# Patient Record
Sex: FEMALE | Race: WHITE | NOT HISPANIC OR LATINO | URBAN - METROPOLITAN AREA
[De-identification: names, ages, dates, MRNs, and addresses within clinical notes are randomized per-mention and may not be internally consistent; named-entity substitution may affect disease eponyms.]

---

## 2018-07-18 ENCOUNTER — INPATIENT (INPATIENT)
Facility: HOSPITAL | Age: 23
LOS: 1 days | Discharge: HOME | End: 2018-07-20
Attending: OBSTETRICS & GYNECOLOGY | Admitting: OBSTETRICS & GYNECOLOGY
Payer: MEDICAID

## 2018-07-18 VITALS — TEMPERATURE: 99 F

## 2018-07-18 DIAGNOSIS — Q17.8 OTHER SPECIFIED CONGENITAL MALFORMATIONS OF EAR: Chronic | ICD-10-CM

## 2018-07-18 RX ORDER — OXYTOCIN 10 UNIT/ML
41.67 VIAL (ML) INJECTION
Qty: 20 | Refills: 0 | Status: DISCONTINUED | OUTPATIENT
Start: 2018-07-18 | End: 2018-07-19

## 2018-07-18 RX ORDER — AMPICILLIN TRIHYDRATE 250 MG
2 CAPSULE ORAL ONCE
Qty: 0 | Refills: 0 | Status: COMPLETED | OUTPATIENT
Start: 2018-07-18 | End: 2018-07-18

## 2018-07-18 RX ORDER — AMPICILLIN TRIHYDRATE 250 MG
CAPSULE ORAL
Qty: 0 | Refills: 0 | Status: DISCONTINUED | OUTPATIENT
Start: 2018-07-18 | End: 2018-07-18

## 2018-07-18 RX ORDER — AMPICILLIN TRIHYDRATE 250 MG
2 CAPSULE ORAL ONCE
Qty: 0 | Refills: 0 | Status: DISCONTINUED | OUTPATIENT
Start: 2018-07-18 | End: 2018-07-18

## 2018-07-18 RX ORDER — AMPICILLIN TRIHYDRATE 250 MG
1 CAPSULE ORAL EVERY 4 HOURS
Qty: 0 | Refills: 0 | Status: DISCONTINUED | OUTPATIENT
Start: 2018-07-18 | End: 2018-07-18

## 2018-07-18 RX ORDER — AMPICILLIN TRIHYDRATE 250 MG
1 CAPSULE ORAL EVERY 4 HOURS
Qty: 0 | Refills: 0 | Status: DISCONTINUED | OUTPATIENT
Start: 2018-07-18 | End: 2018-07-20

## 2018-07-18 RX ORDER — SODIUM CHLORIDE 9 MG/ML
1000 INJECTION, SOLUTION INTRAVENOUS
Qty: 0 | Refills: 0 | Status: DISCONTINUED | OUTPATIENT
Start: 2018-07-18 | End: 2018-07-19

## 2018-07-18 RX ORDER — SODIUM CHLORIDE 9 MG/ML
125 INJECTION, SOLUTION INTRAVENOUS ONCE
Qty: 0 | Refills: 0 | Status: DISCONTINUED | OUTPATIENT
Start: 2018-07-18 | End: 2018-07-19

## 2018-07-18 RX ADMIN — Medication 216 GRAM(S): at 17:46

## 2018-07-18 NOTE — OB PROVIDER TRIAGE NOTE - NSHPPHYSICALEXAM_GEN_ALL_CORE
Vital Signs Last 24 Hrs  T(C): 37.3 (18 Jul 2018 16:48), Max: 37.3 (18 Jul 2018 16:48)  T(F): 99.1 (18 Jul 2018 16:48), Max: 99.1 (18 Jul 2018 16:48)    Udip: 40 ketones, moderate blood, trace protein, trace leukocytes  EFM: 130/mod/accel+  [place]: q2-5min, irregular  SVE: 3/90/-1 as per Dr. Ibarra's exam Vital Signs Last 24 Hrs  T(C): 37.3 (18 Jul 2018 16:48), Max: 37.3 (18 Jul 2018 16:48)  T(F): 99.1 (18 Jul 2018 16:48), Max: 99.1 (18 Jul 2018 16:48)  BP: 131/77  HR: 107    Udip: 40 ketones, moderate blood, trace protein, trace leukocytes  EFM: 130/mod/accel+  [place]: q2-5min, irregular  SVE: 3/90/-1 as per Dr. Ibarra's exam

## 2018-07-18 NOTE — OB PROVIDER H&P - NSNYCREQUIREMENTS_OBGYN_ALL_OB
ADD [facility] REQUIREMENTS SECTION ([facility]/J/SI)...

## 2018-07-18 NOTE — OB PROVIDER H&P - HISTORY OF PRESENT ILLNESS
22 yo  @39w2d w/ SANTINO of 2018 by LMP consistent w/ 1st trimester sono presents with contractions that started at  (07/15/18) night, 5-6/10 intensity, q8min irregular. Denies LOF and VB, feels good FM. No complications.

## 2018-07-18 NOTE — OB PROVIDER TRIAGE NOTE - HISTORY OF PRESENT ILLNESS
22 yo  @39w2d w/ SANTINO of 2018 by LMP consistent w/ 1st trimester sono presents with contractions that started at  (07/15/18) night, 5-6/10 intensity, q8min irregular. Denies LOF and VB, feels good FM. No complications. Last examined today at the office, her cervix was 5cm open.    OB Hx: primigravid  GYN Hx: denies fibroids, abnormal paps, STIs and ovarian cysts 22 yo  @39w2d w/ SANTINO of 2018 by LMP consistent w/ 1st trimester sono presents with contractions that started at  (07/15/18) night, 5-6/10 intensity, q8min irregular. Denies LOF and VB, feels good FM. No complications.     OB Hx: primigravid  GYN Hx: denies fibroids, abnormal paps, STIs and ovarian cysts

## 2018-07-18 NOTE — OB PROVIDER TRIAGE NOTE - NSOBPROVIDERNOTE_OBGYN_ALL_OB_FT
22 yo  @39w2d, GBS pos, in early labor for ambulation w/ IV ampicillin.    -Labor precautions given  -PO hydration encouraged  -Reevaluate in 2-3 hours    Dr. Ibarra aware. Dr. Bean to be made aware.

## 2018-07-18 NOTE — OB PROVIDER H&P - NSHPPHYSICALEXAM_GEN_ALL_CORE
Vital Signs Last 24 Hrs  T(C): 37.3 (18 Jul 2018 16:48), Max: 37.3 (18 Jul 2018 16:48)  T(F): 99.1 (18 Jul 2018 16:48), Max: 99.1 (18 Jul 2018 16:48)  HR: 107  BP: 131/77    Udip: 40 ketones, moderate blood, trace protein, trace leukocytes  EFM: 130/mod/accel+  [place]: q2-5min, irregular  SVE: 3/90/-1 as per Dr. Ibarra's exam

## 2018-07-18 NOTE — OB PROVIDER H&P - ASSESSMENT
22 yo  @39w2d, GBS pos, in early labor.     -Admit  -IVF  -Ampicillin for GBS ppx  -Pain management prn  -Continuous EFM/toco    Dr. Curry and Dr. Zepeda aware.

## 2018-07-18 NOTE — OB PROVIDER H&P - ATTENDING COMMENTS
22 yo  @39w2d, GBS pos, in early labor.   -admit   -iv access  -iv abx for gbs prophylaxis   -epidural requested   -arom w/ next exam

## 2018-07-19 ENCOUNTER — TRANSCRIPTION ENCOUNTER (OUTPATIENT)
Age: 23
End: 2018-07-19

## 2018-07-19 LAB
AMPHET UR-MCNC: NEGATIVE — SIGNIFICANT CHANGE UP
APPEARANCE UR: CLEAR — SIGNIFICANT CHANGE UP
BARBITURATES UR SCN-MCNC: NEGATIVE — SIGNIFICANT CHANGE UP
BASOPHILS # BLD AUTO: 0.04 K/UL — SIGNIFICANT CHANGE UP (ref 0–0.2)
BASOPHILS NFR BLD AUTO: 0.2 % — SIGNIFICANT CHANGE UP (ref 0–1)
BENZODIAZ UR-MCNC: NEGATIVE — SIGNIFICANT CHANGE UP
BILIRUB UR-MCNC: NEGATIVE — SIGNIFICANT CHANGE UP
BLD GP AB SCN SERPL QL: SIGNIFICANT CHANGE UP
COCAINE METAB.OTHER UR-MCNC: NEGATIVE — SIGNIFICANT CHANGE UP
COLOR SPEC: YELLOW — SIGNIFICANT CHANGE UP
DIFF PNL FLD: ABNORMAL
EOSINOPHIL # BLD AUTO: 0.01 K/UL — SIGNIFICANT CHANGE UP (ref 0–0.7)
EOSINOPHIL NFR BLD AUTO: 0.1 % — SIGNIFICANT CHANGE UP (ref 0–8)
EPI CELLS # UR: ABNORMAL /HPF
GLUCOSE UR QL: NEGATIVE — SIGNIFICANT CHANGE UP
HCT VFR BLD CALC: 35.5 % — LOW (ref 37–47)
HGB BLD-MCNC: 11.6 G/DL — LOW (ref 12–16)
IMM GRANULOCYTES NFR BLD AUTO: 1 % — HIGH (ref 0.1–0.3)
KETONES UR-MCNC: >=80
LEUKOCYTE ESTERASE UR-ACNC: ABNORMAL
LYMPHOCYTES # BLD AUTO: 1.36 K/UL — SIGNIFICANT CHANGE UP (ref 1.2–3.4)
LYMPHOCYTES # BLD AUTO: 8 % — LOW (ref 20.5–51.1)
MCHC RBC-ENTMCNC: 27.2 PG — SIGNIFICANT CHANGE UP (ref 27–31)
MCHC RBC-ENTMCNC: 32.7 G/DL — SIGNIFICANT CHANGE UP (ref 32–37)
MCV RBC AUTO: 83.1 FL — SIGNIFICANT CHANGE UP (ref 81–99)
METHADONE UR-MCNC: NEGATIVE — SIGNIFICANT CHANGE UP
MONOCYTES # BLD AUTO: 1.22 K/UL — HIGH (ref 0.1–0.6)
MONOCYTES NFR BLD AUTO: 7.2 % — SIGNIFICANT CHANGE UP (ref 1.7–9.3)
NEUTROPHILS # BLD AUTO: 14.13 K/UL — HIGH (ref 1.4–6.5)
NEUTROPHILS NFR BLD AUTO: 83.5 % — HIGH (ref 42.2–75.2)
NITRITE UR-MCNC: NEGATIVE — SIGNIFICANT CHANGE UP
NRBC # BLD: 0 /100 WBCS — SIGNIFICANT CHANGE UP (ref 0–0)
OPIATES UR-MCNC: NEGATIVE — SIGNIFICANT CHANGE UP
PCP SPEC-MCNC: SIGNIFICANT CHANGE UP
PH UR: 6 — SIGNIFICANT CHANGE UP (ref 5–8)
PLATELET # BLD AUTO: 147 K/UL — SIGNIFICANT CHANGE UP (ref 130–400)
PRENATAL SYPHILIS TEST: SIGNIFICANT CHANGE UP
PROPOXYPHENE QUALITATIVE URINE RESULT: NEGATIVE — SIGNIFICANT CHANGE UP
PROT UR-MCNC: NEGATIVE — SIGNIFICANT CHANGE UP
RBC # BLD: 4.27 M/UL — SIGNIFICANT CHANGE UP (ref 4.2–5.4)
RBC # FLD: 14 % — SIGNIFICANT CHANGE UP (ref 11.5–14.5)
RBC CASTS # UR COMP ASSIST: ABNORMAL /HPF
SP GR SPEC: <=1.005 — SIGNIFICANT CHANGE UP (ref 1.01–1.03)
TYPE + AB SCN PNL BLD: SIGNIFICANT CHANGE UP
UROBILINOGEN FLD QL: 0.2 — SIGNIFICANT CHANGE UP (ref 0.2–0.2)
WBC # BLD: 16.93 K/UL — HIGH (ref 4.8–10.8)
WBC # FLD AUTO: 16.93 K/UL — HIGH (ref 4.8–10.8)
WBC UR QL: ABNORMAL /HPF

## 2018-07-19 PROCEDURE — 59400 OBSTETRICAL CARE: CPT | Mod: U9

## 2018-07-19 RX ORDER — AER TRAVELER 0.5 G/1
1 SOLUTION RECTAL; TOPICAL
Qty: 0 | Refills: 0 | DISCHARGE
Start: 2018-07-19

## 2018-07-19 RX ORDER — LANOLIN
1 OINTMENT (GRAM) TOPICAL EVERY 6 HOURS
Qty: 0 | Refills: 0 | Status: DISCONTINUED | OUTPATIENT
Start: 2018-07-19 | End: 2018-07-20

## 2018-07-19 RX ORDER — DIPHENHYDRAMINE HCL 50 MG
25 CAPSULE ORAL EVERY 6 HOURS
Qty: 0 | Refills: 0 | Status: DISCONTINUED | OUTPATIENT
Start: 2018-07-19 | End: 2018-07-20

## 2018-07-19 RX ORDER — SODIUM CHLORIDE 9 MG/ML
3 INJECTION INTRAMUSCULAR; INTRAVENOUS; SUBCUTANEOUS EVERY 8 HOURS
Qty: 0 | Refills: 0 | Status: DISCONTINUED | OUTPATIENT
Start: 2018-07-19 | End: 2018-07-20

## 2018-07-19 RX ORDER — BENZOCAINE 10 %
1 GEL (GRAM) MUCOUS MEMBRANE EVERY 6 HOURS
Qty: 0 | Refills: 0 | Status: DISCONTINUED | OUTPATIENT
Start: 2018-07-19 | End: 2018-07-20

## 2018-07-19 RX ORDER — IBUPROFEN 200 MG
600 TABLET ORAL EVERY 6 HOURS
Qty: 0 | Refills: 0 | Status: DISCONTINUED | OUTPATIENT
Start: 2018-07-19 | End: 2018-07-20

## 2018-07-19 RX ORDER — DIBUCAINE 1 %
1 OINTMENT (GRAM) RECTAL EVERY 4 HOURS
Qty: 0 | Refills: 0 | Status: DISCONTINUED | OUTPATIENT
Start: 2018-07-19 | End: 2018-07-20

## 2018-07-19 RX ORDER — ACETAMINOPHEN 500 MG
650 TABLET ORAL EVERY 6 HOURS
Qty: 0 | Refills: 0 | Status: DISCONTINUED | OUTPATIENT
Start: 2018-07-19 | End: 2018-07-20

## 2018-07-19 RX ORDER — OXYTOCIN 10 UNIT/ML
41.67 VIAL (ML) INJECTION
Qty: 20 | Refills: 0 | Status: DISCONTINUED | OUTPATIENT
Start: 2018-07-19 | End: 2018-07-20

## 2018-07-19 RX ORDER — HYDROCORTISONE 1 %
1 OINTMENT (GRAM) TOPICAL EVERY 4 HOURS
Qty: 0 | Refills: 0 | Status: DISCONTINUED | OUTPATIENT
Start: 2018-07-19 | End: 2018-07-20

## 2018-07-19 RX ORDER — IBUPROFEN 200 MG
1 TABLET ORAL
Qty: 0 | Refills: 0 | DISCHARGE
Start: 2018-07-19

## 2018-07-19 RX ORDER — OXYCODONE AND ACETAMINOPHEN 5; 325 MG/1; MG/1
1 TABLET ORAL
Qty: 0 | Refills: 0 | Status: DISCONTINUED | OUTPATIENT
Start: 2018-07-19 | End: 2018-07-20

## 2018-07-19 RX ORDER — DOCUSATE SODIUM 100 MG
100 CAPSULE ORAL
Qty: 0 | Refills: 0 | Status: DISCONTINUED | OUTPATIENT
Start: 2018-07-19 | End: 2018-07-20

## 2018-07-19 RX ORDER — SIMETHICONE 80 MG/1
80 TABLET, CHEWABLE ORAL EVERY 6 HOURS
Qty: 0 | Refills: 0 | Status: DISCONTINUED | OUTPATIENT
Start: 2018-07-19 | End: 2018-07-20

## 2018-07-19 RX ORDER — PRAMOXINE HYDROCHLORIDE 150 MG/15G
1 AEROSOL, FOAM RECTAL EVERY 4 HOURS
Qty: 0 | Refills: 0 | Status: DISCONTINUED | OUTPATIENT
Start: 2018-07-19 | End: 2018-07-20

## 2018-07-19 RX ORDER — AER TRAVELER 0.5 G/1
1 SOLUTION RECTAL; TOPICAL EVERY 4 HOURS
Qty: 0 | Refills: 0 | Status: DISCONTINUED | OUTPATIENT
Start: 2018-07-19 | End: 2018-07-20

## 2018-07-19 RX ORDER — GLYCERIN ADULT
1 SUPPOSITORY, RECTAL RECTAL AT BEDTIME
Qty: 0 | Refills: 0 | Status: DISCONTINUED | OUTPATIENT
Start: 2018-07-19 | End: 2018-07-20

## 2018-07-19 RX ORDER — MAGNESIUM HYDROXIDE 400 MG/1
30 TABLET, CHEWABLE ORAL
Qty: 0 | Refills: 0 | Status: DISCONTINUED | OUTPATIENT
Start: 2018-07-19 | End: 2018-07-20

## 2018-07-19 RX ADMIN — SODIUM CHLORIDE 3 MILLILITER(S): 9 INJECTION INTRAMUSCULAR; INTRAVENOUS; SUBCUTANEOUS at 13:22

## 2018-07-19 RX ADMIN — Medication 1 TABLET(S): at 11:59

## 2018-07-19 RX ADMIN — Medication 108 GRAM(S): at 01:30

## 2018-07-19 RX ADMIN — Medication 108 GRAM(S): at 05:34

## 2018-07-19 NOTE — DISCHARGE NOTE OB - MATERIALS PROVIDED
Breastfeeding Mother’s Support Group Information/Guide to Postpartum Care/Vaccinations/[facility]  Screening Program/Tdap Vaccination (VIS Pub Date: 2012)/[place]  Immunization Record

## 2018-07-19 NOTE — DISCHARGE NOTE OB - CARE PROVIDER_API CALL
Julio Curry), Obstetrics and Gynecology  [address] [place] [address]  Phone: [phone]  Fax: [phone]

## 2018-07-19 NOTE — OB PROVIDER DELIVERY SUMMARY - NSPROVIDERDELIVERYNOTE_OBGYN_ALL_OB_FT
Patient was fully dilated and pushed. NSD live female , Apgars 9/9 , over a midline episiotomy without extension. Vtx delivered OA ,  Fetal head restituted to LOT. The anterior and posterior shoulders delivered, followed by the remaining body atraumatically. Delayed cord clamping was performed, and then clamped and cut. Cord blood & gases collected. The  was handed to mother for skin to skin. The placenta delivered spontaneously intact with 3v cord. Uterus massaged and firm with oxytocin given IV. Cervix, vagina and perineum inspected no extensions. Repair of episiotomy , in the usual fashion with 2-0, 3-0 chromic.   EBL -300cc, hemostasis assured.

## 2018-07-19 NOTE — DISCHARGE NOTE OB - PATIENT PORTAL LINK FT
You can access the [facility] Patient Portal, offered by [facility], by registering with the following website: [email or web address]

## 2018-07-19 NOTE — DISCHARGE NOTE OB - HOSPITAL COURSE
DATE OF DISCHARGE: 18 @ 22:25    HISTORY OF PRESENT ILLNESS/HOSPITAL COURSE: HPI:  24 yo  @39w2d w/ SANTINO of 2018 by LMP consistent w/ 1st trimester sono presents with contractions that started at  (07/15/18) night, 5-6/10 intensity, q8min irregular. Denies LOF and VB, feels good FM. No complications. (2018 19:18)    PAST MEDICAL & SURGICAL HISTORY:  No pertinent past medical history  Eustachian tube anomaly      PROCEDURES PERFORMED: vaginal delivery    COMPLICATIONS:  none    POST PARTUM COURSE: uncomplicated      FINAL DIAGNOSIS:  normal delivery    LABS:                       11.6   16.93 )-----------( 147      ( 2018 00:05 )             35.5

## 2018-07-19 NOTE — PROGRESS NOTE ADULT - SUBJECTIVE AND OBJECTIVE BOX
PGY1 Note    Patient seen at bedside. No complaints at the moment. Reports mild pain from contractions.     T(F): 98.1 (07-18 @ 19:42), Max: 99.1 (07-18 @ 16:48)  HR: 81 (07-19 @ 00:04)  BP: 107/55 (07-19 @ 00:04) (94/55 - 135/76)  RR: 18 (07-18 @ 23:10)  EFM: 120/mod dorian/accels  TOCO: 4-5min   SVE: deferred     Medications:  ampicillin  IVPB: 216 (07-18 @ 17:46)      Labs:      pending

## 2018-07-19 NOTE — PROGRESS NOTE ADULT - ASSESSMENT
HPI:  24 yo  @39w2d, GBS positive, no complications, in labor progressing well.  -Continue current management   -Pain management prn  -Continuous EFM/toco  -F/u pending labs  -Ampicillin for GBS ppx  -Reevaluate     Dr. Osullivan aware, Dr. Curry to be made aware

## 2018-07-20 VITALS
TEMPERATURE: 97 F | HEART RATE: 79 BPM | DIASTOLIC BLOOD PRESSURE: 80 MMHG | RESPIRATION RATE: 18 BRPM | SYSTOLIC BLOOD PRESSURE: 133 MMHG

## 2018-07-20 LAB
BASOPHILS # BLD AUTO: 0.03 K/UL — SIGNIFICANT CHANGE UP (ref 0–0.2)
BASOPHILS NFR BLD AUTO: 0.2 % — SIGNIFICANT CHANGE UP (ref 0–1)
EOSINOPHIL # BLD AUTO: 0.13 K/UL — SIGNIFICANT CHANGE UP (ref 0–0.7)
EOSINOPHIL NFR BLD AUTO: 0.9 % — SIGNIFICANT CHANGE UP (ref 0–8)
HCT VFR BLD CALC: 29 % — LOW (ref 37–47)
HGB BLD-MCNC: 9.5 G/DL — LOW (ref 12–16)
IMM GRANULOCYTES NFR BLD AUTO: 1.1 % — HIGH (ref 0.1–0.3)
LYMPHOCYTES # BLD AUTO: 15.6 % — LOW (ref 20.5–51.1)
LYMPHOCYTES # BLD AUTO: 2.35 K/UL — SIGNIFICANT CHANGE UP (ref 1.2–3.4)
MCHC RBC-ENTMCNC: 27.2 PG — SIGNIFICANT CHANGE UP (ref 27–31)
MCHC RBC-ENTMCNC: 32.8 G/DL — SIGNIFICANT CHANGE UP (ref 32–37)
MCV RBC AUTO: 83.1 FL — SIGNIFICANT CHANGE UP (ref 81–99)
MONOCYTES # BLD AUTO: 1.61 K/UL — HIGH (ref 0.1–0.6)
MONOCYTES NFR BLD AUTO: 10.7 % — HIGH (ref 1.7–9.3)
NEUTROPHILS # BLD AUTO: 10.77 K/UL — HIGH (ref 1.4–6.5)
NEUTROPHILS NFR BLD AUTO: 71.5 % — SIGNIFICANT CHANGE UP (ref 42.2–75.2)
NRBC # BLD: 0 /100 WBCS — SIGNIFICANT CHANGE UP (ref 0–0)
PLATELET # BLD AUTO: 141 K/UL — SIGNIFICANT CHANGE UP (ref 130–400)
RBC # BLD: 3.49 M/UL — LOW (ref 4.2–5.4)
RBC # FLD: 14 % — SIGNIFICANT CHANGE UP (ref 11.5–14.5)
WBC # BLD: 15.06 K/UL — HIGH (ref 4.8–10.8)
WBC # FLD AUTO: 15.06 K/UL — HIGH (ref 4.8–10.8)

## 2018-07-20 RX ADMIN — Medication 1 APPLICATION(S): at 08:43

## 2018-07-20 RX ADMIN — Medication 600 MILLIGRAM(S): at 08:36

## 2018-07-20 RX ADMIN — Medication 600 MILLIGRAM(S): at 00:12

## 2018-07-20 NOTE — PROGRESS NOTE ADULT - SUBJECTIVE AND OBJECTIVE BOX
OB attending  PPD #1    Pt doing well, pain well controlled. No overnight events, no acute complaints.    Ambulating: Yes  Voiding: Yes  Flatus: Yes  Bowel movements: Yes   Breast or bottle feeding: Breastfeeding  Diet: Regular    PAST MEDICAL & SURGICAL HISTORY:  No pertinent past medical history  Eustachian tube anomaly      Physical Exam  Vital Signs Last 24 Hrs  T(C): 36.7 (2018 00:31), Max: 37.2 (2018 09:08)  T(F): 98.1 (2018 00:31), Max: 99 (2018 09:08)  HR: 94 (2018 00:31) (91 - 121)  BP: 128/80 (2018 00:31) (105/57 - 135/70)  BP(mean): --  RR: 18 (2018 00:31) (18 - 19)  SpO2: --  Gen: AAOx3, NAD  Abd: Soft, nontender, nondistended, BS+  Fundus: Firm, below umbilicus  Lochia: normal  Ext: No calf tenderness, no swelling    Labs:                        11.6   16.93 )-----------( 147      ( 2018 00:05 )             35.5       A/P: 22 yo , s/p , PPD #1, doing well  - continue current management

## 2018-07-26 DIAGNOSIS — Z34.90 ENCOUNTER FOR SUPERVISION OF NORMAL PREGNANCY, UNSPECIFIED, UNSPECIFIED TRIMESTER: ICD-10-CM

## 2018-07-26 DIAGNOSIS — Z3A.39 39 WEEKS GESTATION OF PREGNANCY: ICD-10-CM

## 2019-04-14 NOTE — PROCEDURE NOTE - ATTENDING PROVIDER
Avshalumov *** YOU ARE LEAVING THE HOSPITAL AGAINST MEDICAL ADVISE: YOU HAVE VERBALIZED UNDERSTANDING OF THE POSSIBLE RISKS OF DISCHARGE BEFORE COMPLETING MEDICAL TREATMENT UP TO AND INCLUDING PERMANENT DISABILITY AND DEATH>

## 2019-05-28 ENCOUNTER — ASOB RESULT (OUTPATIENT)
Age: 24
End: 2019-05-28

## 2019-05-28 ENCOUNTER — APPOINTMENT (OUTPATIENT)
Dept: ANTEPARTUM | Facility: CLINIC | Age: 24
End: 2019-05-28
Payer: MEDICAID

## 2019-05-28 PROCEDURE — 76811 OB US DETAILED SNGL FETUS: CPT

## 2019-06-04 ENCOUNTER — APPOINTMENT (OUTPATIENT)
Dept: ANTEPARTUM | Facility: CLINIC | Age: 24
End: 2019-06-04
Payer: MEDICAID

## 2019-06-04 ENCOUNTER — ASOB RESULT (OUTPATIENT)
Age: 24
End: 2019-06-04

## 2019-06-04 PROCEDURE — 76816 OB US FOLLOW-UP PER FETUS: CPT

## 2019-09-18 ENCOUNTER — INPATIENT (INPATIENT)
Facility: HOSPITAL | Age: 24
LOS: 3 days | Discharge: HOME | End: 2019-09-22
Attending: OBSTETRICS & GYNECOLOGY | Admitting: OBSTETRICS & GYNECOLOGY
Payer: MEDICAID

## 2019-09-18 VITALS
HEIGHT: 63 IN | TEMPERATURE: 99 F | SYSTOLIC BLOOD PRESSURE: 126 MMHG | RESPIRATION RATE: 20 BRPM | WEIGHT: 169.09 LBS | HEART RATE: 109 BPM | DIASTOLIC BLOOD PRESSURE: 73 MMHG

## 2019-09-18 DIAGNOSIS — Q17.8 OTHER SPECIFIED CONGENITAL MALFORMATIONS OF EAR: Chronic | ICD-10-CM

## 2019-09-18 LAB
AMPHET UR-MCNC: NEGATIVE — SIGNIFICANT CHANGE UP
APPEARANCE UR: CLEAR — SIGNIFICANT CHANGE UP
BACTERIA # UR AUTO: NEGATIVE — SIGNIFICANT CHANGE UP
BARBITURATES UR SCN-MCNC: NEGATIVE — SIGNIFICANT CHANGE UP
BASOPHILS # BLD AUTO: 0.03 K/UL — SIGNIFICANT CHANGE UP (ref 0–0.2)
BASOPHILS NFR BLD AUTO: 0.2 % — SIGNIFICANT CHANGE UP (ref 0–1)
BENZODIAZ UR-MCNC: NEGATIVE — SIGNIFICANT CHANGE UP
BILIRUB UR-MCNC: NEGATIVE — SIGNIFICANT CHANGE UP
BLD GP AB SCN SERPL QL: SIGNIFICANT CHANGE UP
BUPRENORPHINE SCREEN, URINE RESULT: NEGATIVE — SIGNIFICANT CHANGE UP
COCAINE METAB.OTHER UR-MCNC: NEGATIVE — SIGNIFICANT CHANGE UP
COLOR SPEC: YELLOW — SIGNIFICANT CHANGE UP
DIFF PNL FLD: SIGNIFICANT CHANGE UP
EOSINOPHIL # BLD AUTO: 0.01 K/UL — SIGNIFICANT CHANGE UP (ref 0–0.7)
EOSINOPHIL NFR BLD AUTO: 0.1 % — SIGNIFICANT CHANGE UP (ref 0–8)
EPI CELLS # UR: 4 /HPF — SIGNIFICANT CHANGE UP (ref 0–5)
GLUCOSE UR QL: NEGATIVE — SIGNIFICANT CHANGE UP
HCT VFR BLD CALC: 37.6 % — SIGNIFICANT CHANGE UP (ref 37–47)
HGB BLD-MCNC: 12.2 G/DL — SIGNIFICANT CHANGE UP (ref 12–16)
HYALINE CASTS # UR AUTO: 2 /LPF — SIGNIFICANT CHANGE UP (ref 0–7)
IMM GRANULOCYTES NFR BLD AUTO: 1 % — HIGH (ref 0.1–0.3)
KETONES UR-MCNC: NEGATIVE — SIGNIFICANT CHANGE UP
L&D DRUG SCREEN, URINE: SIGNIFICANT CHANGE UP
LEUKOCYTE ESTERASE UR-ACNC: NEGATIVE — SIGNIFICANT CHANGE UP
LYMPHOCYTES # BLD AUTO: 1.36 K/UL — SIGNIFICANT CHANGE UP (ref 1.2–3.4)
LYMPHOCYTES # BLD AUTO: 8.6 % — LOW (ref 20.5–51.1)
MCHC RBC-ENTMCNC: 26.9 PG — LOW (ref 27–31)
MCHC RBC-ENTMCNC: 32.4 G/DL — SIGNIFICANT CHANGE UP (ref 32–37)
MCV RBC AUTO: 82.8 FL — SIGNIFICANT CHANGE UP (ref 81–99)
METHADONE UR-MCNC: NEGATIVE — SIGNIFICANT CHANGE UP
MONOCYTES # BLD AUTO: 0.95 K/UL — HIGH (ref 0.1–0.6)
MONOCYTES NFR BLD AUTO: 6 % — SIGNIFICANT CHANGE UP (ref 1.7–9.3)
NEUTROPHILS # BLD AUTO: 13.32 K/UL — HIGH (ref 1.4–6.5)
NEUTROPHILS NFR BLD AUTO: 84.1 % — HIGH (ref 42.2–75.2)
NITRITE UR-MCNC: NEGATIVE — SIGNIFICANT CHANGE UP
NRBC # BLD: 0 /100 WBCS — SIGNIFICANT CHANGE UP (ref 0–0)
OPIATES UR-MCNC: NEGATIVE — SIGNIFICANT CHANGE UP
OXYCODONE UR-MCNC: NEGATIVE — SIGNIFICANT CHANGE UP
PCP UR-MCNC: NEGATIVE — SIGNIFICANT CHANGE UP
PH UR: 7.5 — SIGNIFICANT CHANGE UP (ref 5–8)
PLATELET # BLD AUTO: 144 K/UL — SIGNIFICANT CHANGE UP (ref 130–400)
PRENATAL SYPHILIS TEST: SIGNIFICANT CHANGE UP
PROPOXYPHENE QUALITATIVE URINE RESULT: NEGATIVE — SIGNIFICANT CHANGE UP
PROT UR-MCNC: ABNORMAL
RBC # BLD: 4.54 M/UL — SIGNIFICANT CHANGE UP (ref 4.2–5.4)
RBC # FLD: 13.1 % — SIGNIFICANT CHANGE UP (ref 11.5–14.5)
RBC CASTS # UR COMP ASSIST: 9 /HPF — HIGH (ref 0–4)
SP GR SPEC: 1.02 — SIGNIFICANT CHANGE UP (ref 1.01–1.02)
UROBILINOGEN FLD QL: SIGNIFICANT CHANGE UP
WBC # BLD: 15.83 K/UL — HIGH (ref 4.8–10.8)
WBC # FLD AUTO: 15.83 K/UL — HIGH (ref 4.8–10.8)
WBC UR QL: 2 /HPF — SIGNIFICANT CHANGE UP (ref 0–5)

## 2019-09-18 PROCEDURE — 59400 OBSTETRICAL CARE: CPT | Mod: U7

## 2019-09-18 RX ORDER — ACETAMINOPHEN 500 MG
975 TABLET ORAL
Refills: 0 | Status: DISCONTINUED | OUTPATIENT
Start: 2019-09-18 | End: 2019-09-22

## 2019-09-18 RX ORDER — PRAMOXINE HYDROCHLORIDE 150 MG/15G
1 AEROSOL, FOAM RECTAL EVERY 4 HOURS
Refills: 0 | Status: DISCONTINUED | OUTPATIENT
Start: 2019-09-18 | End: 2019-09-22

## 2019-09-18 RX ORDER — OXYTOCIN 10 UNIT/ML
333.33 VIAL (ML) INJECTION
Qty: 20 | Refills: 0 | Status: DISCONTINUED | OUTPATIENT
Start: 2019-09-18 | End: 2019-09-22

## 2019-09-18 RX ORDER — BENZOCAINE 10 %
1 GEL (GRAM) MUCOUS MEMBRANE EVERY 6 HOURS
Refills: 0 | Status: DISCONTINUED | OUTPATIENT
Start: 2019-09-18 | End: 2019-09-22

## 2019-09-18 RX ORDER — AER TRAVELER 0.5 G/1
1 SOLUTION RECTAL; TOPICAL EVERY 4 HOURS
Refills: 0 | Status: DISCONTINUED | OUTPATIENT
Start: 2019-09-18 | End: 2019-09-22

## 2019-09-18 RX ORDER — BUPIVACAINE HCL/PF 7.5 MG/ML
250 VIAL (ML) INJECTION
Refills: 0 | Status: DISCONTINUED | OUTPATIENT
Start: 2019-09-18 | End: 2019-09-22

## 2019-09-18 RX ORDER — SODIUM CHLORIDE 9 MG/ML
1000 INJECTION, SOLUTION INTRAVENOUS
Refills: 0 | Status: DISCONTINUED | OUTPATIENT
Start: 2019-09-18 | End: 2019-09-18

## 2019-09-18 RX ORDER — IBUPROFEN 200 MG
600 TABLET ORAL EVERY 6 HOURS
Refills: 0 | Status: DISCONTINUED | OUTPATIENT
Start: 2019-09-18 | End: 2019-09-22

## 2019-09-18 RX ORDER — DIPHENHYDRAMINE HCL 50 MG
25 CAPSULE ORAL EVERY 6 HOURS
Refills: 0 | Status: DISCONTINUED | OUTPATIENT
Start: 2019-09-18 | End: 2019-09-22

## 2019-09-18 RX ORDER — DIBUCAINE 1 %
1 OINTMENT (GRAM) RECTAL EVERY 6 HOURS
Refills: 0 | Status: DISCONTINUED | OUTPATIENT
Start: 2019-09-18 | End: 2019-09-22

## 2019-09-18 RX ORDER — GLYCERIN ADULT
1 SUPPOSITORY, RECTAL RECTAL AT BEDTIME
Refills: 0 | Status: DISCONTINUED | OUTPATIENT
Start: 2019-09-18 | End: 2019-09-22

## 2019-09-18 RX ORDER — HYDROCORTISONE 1 %
1 OINTMENT (GRAM) TOPICAL EVERY 6 HOURS
Refills: 0 | Status: DISCONTINUED | OUTPATIENT
Start: 2019-09-18 | End: 2019-09-22

## 2019-09-18 RX ORDER — SIMETHICONE 80 MG/1
80 TABLET, CHEWABLE ORAL EVERY 4 HOURS
Refills: 0 | Status: DISCONTINUED | OUTPATIENT
Start: 2019-09-18 | End: 2019-09-22

## 2019-09-18 RX ORDER — LANOLIN
1 OINTMENT (GRAM) TOPICAL EVERY 6 HOURS
Refills: 0 | Status: DISCONTINUED | OUTPATIENT
Start: 2019-09-18 | End: 2019-09-22

## 2019-09-18 RX ORDER — OXYCODONE HYDROCHLORIDE 5 MG/1
5 TABLET ORAL ONCE
Refills: 0 | Status: DISCONTINUED | OUTPATIENT
Start: 2019-09-18 | End: 2019-09-22

## 2019-09-18 RX ORDER — AMPICILLIN TRIHYDRATE 250 MG
1 CAPSULE ORAL EVERY 4 HOURS
Refills: 0 | Status: DISCONTINUED | OUTPATIENT
Start: 2019-09-18 | End: 2019-09-18

## 2019-09-18 RX ORDER — MAGNESIUM HYDROXIDE 400 MG/1
30 TABLET, CHEWABLE ORAL
Refills: 0 | Status: DISCONTINUED | OUTPATIENT
Start: 2019-09-18 | End: 2019-09-22

## 2019-09-18 RX ORDER — AMPICILLIN TRIHYDRATE 250 MG
2 CAPSULE ORAL ONCE
Refills: 0 | Status: COMPLETED | OUTPATIENT
Start: 2019-09-18 | End: 2019-09-18

## 2019-09-18 RX ORDER — KETOROLAC TROMETHAMINE 30 MG/ML
30 SYRINGE (ML) INJECTION ONCE
Refills: 0 | Status: DISCONTINUED | OUTPATIENT
Start: 2019-09-18 | End: 2019-09-18

## 2019-09-18 RX ORDER — DOCUSATE SODIUM 100 MG
100 CAPSULE ORAL
Refills: 0 | Status: DISCONTINUED | OUTPATIENT
Start: 2019-09-18 | End: 2019-09-22

## 2019-09-18 RX ORDER — OXYCODONE HYDROCHLORIDE 5 MG/1
5 TABLET ORAL
Refills: 0 | Status: DISCONTINUED | OUTPATIENT
Start: 2019-09-18 | End: 2019-09-22

## 2019-09-18 RX ADMIN — Medication 30 MILLIGRAM(S): at 17:20

## 2019-09-18 RX ADMIN — Medication 600 MILLIGRAM(S): at 23:32

## 2019-09-18 RX ADMIN — Medication 1 APPLICATION(S): at 23:37

## 2019-09-18 RX ADMIN — Medication 216 GRAM(S): at 12:00

## 2019-09-18 RX ADMIN — Medication 108 GRAM(S): at 15:35

## 2019-09-18 RX ADMIN — Medication 1 SPRAY(S): at 23:37

## 2019-09-18 NOTE — OB RN PATIENT PROFILE - PRETERM DELIVERIES, OB PROFILE
At [facility], one important tool we use to improve our patient services is our Patient Survey.  Following your visit you may receive our survey in the mail.    Please take the time to complete the survey.    If your visit with us was great, we want to hear about it.    If we can improve, please let us know how.         
0

## 2019-09-18 NOTE — PROGRESS NOTE ADULT - SUBJECTIVE AND OBJECTIVE BOX
PGY2 L&D Progress Note    Patient seen at bedside, doing well, no complaints.     T(F): 99.2 ( @ 11:50), Max: 99.2 ( @ 11:50)  HR: 113 ( @ 13:24)  BP: 102/60 ( @ 13:15) (102/60 - 126/73)  RR: 20 ( @ 11:50)  EFM: 140/mod/accel+/intermittent variable decels  TOCO: q4min  SVE: deferred, last exam at 1152, /-1, intact, vertex as per Dr. Hernandez's exam     Medications:  ampicillin started at 1200  epidural started at 1224    Labs:                        12.2   15.83 )-----------( 144      ( 18 Sep 2019 11:52 )             37.6       Antibody Screen: NEG (19 @ 11:52)    A/P:   25yo  at 36w1d, GBS unknown, s/p ampicillin, s/p epidural, in active labor,     -pain management prn  -cont efm/toco  -f/u pending labs (HIV, UDS, RPR)  -cont to monitor vitals  -cont iv hydration    Dr. Hernandez and Dr. Deleon to be made aware.

## 2019-09-18 NOTE — OB PROVIDER H&P - HISTORY OF PRESENT ILLNESS
24y  @ 36.1 weeks by first trimester sono, EDC 10/15/19, present for ctx. Ctx began 2100, q 10 mins, now q 2 mins. GBS unknown. Denies VB and LOF. +FM. No complications with this pregnancy. Last seen in the office today, exam was 6 cm.

## 2019-09-18 NOTE — OB PROVIDER DELIVERY SUMMARY - NSPROVIDERDELIVERYNOTE_OBGYN_ALL_OB_FT
Patient was fully dilated and pushing. Fetal head was OA and restituted to LOT. The anterior and posterior shoulders delivered, followed by the remaining body atraumatically. Delayed cord clamping was performed, and then clamped and cut. Cord blood gases collected x2. The  was handed to the mother. The placenta delivered intact with membranes. Pitocin was administered. Uterus massaged, fundus found to be firm. Cervix, vagina and perineum inspected. Second degree laceration was noted, repaired using 2-0 chromic in the usual fashion with good hemostasis.     Viable male infant delivered, weighing 2730 gms, with APGARs 9/9    Laceration: 2nd degree laceration  EBL 300cc

## 2019-09-18 NOTE — PROCEDURE NOTE - NSLOADDOSE_OBGYN_ALL_OB
0.1% Bupivacaine with 250 mcg Fentanyl infusion started @ 15 ml/hr/10 ML of 0.25 percent Bupivicaine

## 2019-09-18 NOTE — OB PROVIDER H&P - NSHPPHYSICALEXAM_GEN_ALL_CORE
T(C): 37.3 (09-18-19 @ 11:50), Max: 37.3 (09-18-19 @ 11:50)  HR: 109 (09-18-19 @ 11:50) (109 - 109)  BP: 126/73 (09-18-19 @ 11:50) (126/73 - 126/73)  RR: 20 (09-18-19 @ 11:50) (20 - 20)    EFM: 160 bpm, moderate variability, +accels  TOCO: q 2 mins

## 2019-09-18 NOTE — OB PROVIDER H&P - ASSESSMENT
24y  @ 36.1 weeks, GBS unknown, in active labor.     Admit to L & D  IV hydration, labs  IV antibiotics  Continuous EFM & TOCO monitoring  Clear Liquid diet  Pain Management PRN    Dr. Hernandez aware

## 2019-09-18 NOTE — OB PROVIDER H&P - NS_OBGYNHISTORY_OBGYN_ALL_OB_FT
OB Hx:  x 1. Largest 7-6               G1 2018 FT  7-6 No complications Received Epidural    Gyn Hx:  Denies cysts, fibroids, abnormal paps, and STIs.

## 2019-09-18 NOTE — PROGRESS NOTE ADULT - SUBJECTIVE AND OBJECTIVE BOX
PGY4 Note    Pt reexamined and was 9/100/0 per PMD, intact,   EFM: 130, mod dorian, +accel  [place]: q4mins  Will continue to monitor for urge to push, will attempt to make the 4 hour heide to pt gets second dose of ampicillin for GBS unknown status <37wks.     Dr. Hernandez at bedside

## 2019-09-19 LAB
HCT VFR BLD CALC: 34.2 % — LOW (ref 37–47)
HGB BLD-MCNC: 11 G/DL — LOW (ref 12–16)
HIV 1+2 AB+HIV1 P24 AG SERPL QL IA: SIGNIFICANT CHANGE UP
MCHC RBC-ENTMCNC: 27 PG — SIGNIFICANT CHANGE UP (ref 27–31)
MCHC RBC-ENTMCNC: 32.2 G/DL — SIGNIFICANT CHANGE UP (ref 32–37)
MCV RBC AUTO: 83.8 FL — SIGNIFICANT CHANGE UP (ref 81–99)
NRBC # BLD: 0 /100 WBCS — SIGNIFICANT CHANGE UP (ref 0–0)
PLATELET # BLD AUTO: 153 K/UL — SIGNIFICANT CHANGE UP (ref 130–400)
RBC # BLD: 4.08 M/UL — LOW (ref 4.2–5.4)
RBC # FLD: 13.2 % — SIGNIFICANT CHANGE UP (ref 11.5–14.5)
WBC # BLD: 14.11 K/UL — HIGH (ref 4.8–10.8)
WBC # FLD AUTO: 14.11 K/UL — HIGH (ref 4.8–10.8)

## 2019-09-19 RX ADMIN — Medication 600 MILLIGRAM(S): at 18:16

## 2019-09-19 RX ADMIN — Medication 975 MILLIGRAM(S): at 21:57

## 2019-09-19 RX ADMIN — Medication 600 MILLIGRAM(S): at 11:57

## 2019-09-19 RX ADMIN — Medication 600 MILLIGRAM(S): at 06:39

## 2019-09-19 NOTE — PROGRESS NOTE ADULT - SUBJECTIVE AND OBJECTIVE BOX
Subjective:   Patient doing well. No complaints. Minimal lochia. Pain controlled.    Objective:   T(F): 96.8 ( @ 08:19), Max: 99 ( @ 18:47)  HR: 76 ( @ 08:19)  BP: 105/61 ( @ 08:19) (104/71 - 133/60)  RR: 19 ( @ 08:19)  SpO2: 100% ( @ 16:34) (100% - 100%)  Gen: AAOx3, NAD  Abd: Soft, Nontender, Nondistended, Fundus firm below the umbilicus  Ext: no tender, mild edema  Min Lochia Rubra    Labs:                        11.0   14.11 )-----------( 153      ( 19 Sep 2019 12:08 )             34.2             Tolerating regular diet  Passed flatus, passed bowel movement  Breast/Bottle feeding    Assessment:   24y s/p , PPD#1, doing well    Plan:  -Routine postpartum care  -Encouraged ambulation and PO hydration  -Tolerating regular diet

## 2019-09-20 ENCOUNTER — TRANSCRIPTION ENCOUNTER (OUTPATIENT)
Age: 24
End: 2019-09-20

## 2019-09-20 RX ADMIN — Medication 600 MILLIGRAM(S): at 18:42

## 2019-09-20 RX ADMIN — Medication 600 MILLIGRAM(S): at 12:51

## 2019-09-20 RX ADMIN — Medication 600 MILLIGRAM(S): at 00:08

## 2019-09-20 RX ADMIN — Medication 600 MILLIGRAM(S): at 23:32

## 2019-09-20 NOTE — DISCHARGE NOTE OB - MEDICATION SUMMARY - MEDICATIONS TO STOP TAKING
I will STOP taking the medications listed below when I get home from the hospital:    ibuprofen 600 mg oral tablet  -- 1 tab(s) by mouth every 6 hours, As needed, Moderate Pain    witch hazel 50% topical pad  -- 1 application on skin every 4 hours, As needed, Perineal Discomfort    Prenatal Multivitamins with Folic Acid 1 mg oral tablet  -- 1 tab(s) by mouth once a day

## 2019-09-20 NOTE — DISCHARGE NOTE OB - PATIENT PORTAL LINK FT
You can access the FollowMyHealth Patient Portal offered by [facility] by registering at the following website: [email or web address]. By joining [email or web address]’s FollowMyHealth portal, you will also be able to view your health information using other applications (apps) compatible with our system.

## 2019-09-20 NOTE — DISCHARGE NOTE OB - CARE PROVIDER_API CALL
Julio Curry)  Obstetrics and Gynecology  [address] [place] [address]  Phone: [phone]  Fax: [phone]  Follow Up Time:

## 2019-09-20 NOTE — DISCHARGE NOTE OB - ADDITIONAL INSTRUCTIONS
If you experience any of the following, please notify your provider:  -fever >100.4F  -increased vaginal bleeding or clotting (saturating a pad an hour)  -foul smelling discharge or bloody discharge from your incision site  -severe abdominal, vaginal, or rectal pain   -persistent headache or vision changes  -swollen areas on your legs that are red, hot, or painful   -swollen, hot, painful areas and/or streaks on your breasts  -cracked or bleeding nipples  -mood swings, depression, or crying spells lasting more than 3 days     Please schedule an appointment to see your physician in 6 weeks for a postpartum visit If you experience any of the following, please notify your provider:  -fever >100.4F  -increased vaginal bleeding or clotting (saturating a pad an hour)  -foul smelling discharge or bloody discharge from your incision site  -severe abdominal, vaginal, or rectal pain   -persistent headache or vision changes  -swollen areas on your legs that are red, hot, or painful   -swollen, hot, painful areas and/or streaks on your breasts  -cracked or bleeding nipples  -mood swings, depression, or crying spells lasting more than 3 days     Please schedule an appointment to see your physician on Wednesday for a blood pressure check

## 2019-09-20 NOTE — PROGRESS NOTE ADULT - SUBJECTIVE AND OBJECTIVE BOX
OB attending  PPD #2    Pt doing well, pain well controlled. No overnight events, no acute complaints.    Ambulating: Yes  Voiding: Yes  Flatus: Yes  Bowel movements: Yes   Breast or bottle feeding: Breastfeeding  Diet: Regular    PAST MEDICAL & SURGICAL HISTORY:  No pertinent past medical history  Eustachian tube anomaly      Physical Exam  Vital Signs Last 24 Hrs  T(C): 35.8 (20 Sep 2019 00:10), Max: 36 (19 Sep 2019 08:19)  T(F): 96.5 (20 Sep 2019 00:10), Max: 96.8 (19 Sep 2019 08:19)  HR: 79 (20 Sep 2019 00:10) (72 - 79)  BP: 127/74 (20 Sep 2019 00:10) (97/56 - 127/74)  BP(mean): --  RR: 18 (20 Sep 2019 00:10) (18 - 19)  SpO2: --  Gen: AAOx3, NAD  Abd: Soft, nontender, nondistended, BS+  Fundus: Firm, below umbilicus  Lochia: normal  Ext: No calf tenderness, no swelling    Labs:                        11.0   14.11 )-----------( 153      ( 19 Sep 2019 12:08 )             34.2         A/P: s/p , PPD #2, doing well  - d/c home OB attending  PPD #2    Pt doing well, pain well controlled. No overnight events, no acute complaints.    Ambulating: Yes  Voiding: Yes  Flatus: Yes  Bowel movements: Yes   Breast or bottle feeding: Breastfeeding  Diet: Regular    PAST MEDICAL & SURGICAL HISTORY:  No pertinent past medical history  Eustachian tube anomaly      Physical Exam  Vital Signs Last 24 Hrs  T(C): 35.8 (20 Sep 2019 00:10), Max: 36 (19 Sep 2019 08:19)  T(F): 96.5 (20 Sep 2019 00:10), Max: 96.8 (19 Sep 2019 08:19)  HR: 79 (20 Sep 2019 00:10) (72 - 79)  BP: 127/74 (20 Sep 2019 00:10) (97/56 - 127/74)  BP(mean): --  RR: 18 (20 Sep 2019 00:10) (18 - 19)  SpO2: --  Gen: AAOx3, NAD  Abd: Soft, nontender, nondistended, BS+  Fundus: Firm, below umbilicus  Lochia: normal  Ext: No calf tenderness, no swelling    Labs:                        11.0   14.11 )-----------( 153      ( 19 Sep 2019 12:08 )             34.2         A/P: s/p , PPD #2, doing well  - to stay one more day

## 2019-09-20 NOTE — DISCHARGE NOTE OB - HOSPITAL COURSE
DATE OF DISCHARGE: 19 @ 07:04    HISTORY OF PRESENT ILLNESS/HOSPITAL COURSE: HPI:  24y  @ 36.1 weeks by first trimester pema, EDC 10/15/19, present for ctx. Ctx began 2100, q 10 mins, now q 2 mins. GBS unknown. Denies VB and LOF. +FM. No complications with this pregnancy. Last seen in the office today, exam was 6 cm. (18 Sep 2019 11:49)    PAST MEDICAL & SURGICAL HISTORY:  No pertinent past medical history  Eustachian tube anomaly      PROCEDURES PERFORMED: vaginal delivery    COMPLICATIONS:  none    POST PARTUM COURSE: ucomplicated       FINAL DIAGNOSIS:  normal vaginal delivery    LABS:                       11.0   14.11 )-----------( 153      ( 19 Sep 2019 12:08 )             34.2

## 2019-09-20 NOTE — DISCHARGE NOTE OB - REASON FOR REFUSAL (REFER PATIENT TO HEALTHCARE PROVIDER FOR FOLLOW-UP):
Detail Level: Zone Note Text (......Xxx Chief Complaint.): This diagnosis correlates with the Other (Free Text): - AA vs. PPD improved today; pt has used sample of impoyx 1 week \\n- pt was taken off of cardizem a put remains on eliquis. Pt reports getting a new lesion after stopping the cardizem \\n- pt reports palms had recently became more red than normal \\n- discussed biopsy today; pt opted for punch biopsy today \\n- pt is on eliquis due to cardiovert pt 9-23-19; pt is not sure if she will have to be on eliquis for long term \\n- due to pt only taking one BP med, pt requested we take BP, BP was WNL\\n- more samples of impoyx given to pt in case she gets new lesions requested pt to take pictures if she gets more lesions\\n- BP high in clinic today, discussed to call doctor that is over seeing BP and to reports blood pressure. \\n- Cont Dry and sensitive skin care; spf 30+/sun protex \\nRTC 10 days s/f I don't want it right now

## 2019-09-21 RX ADMIN — Medication 600 MILLIGRAM(S): at 23:54

## 2019-09-21 RX ADMIN — Medication 600 MILLIGRAM(S): at 12:32

## 2019-09-21 NOTE — PROGRESS NOTE ADULT - SUBJECTIVE AND OBJECTIVE BOX
Subjective:   Patient doing well. No complaints. Minimal lochia. Pain controlled.    Objective:   T(F): 97 ( @ 07:48), Max: 97 ( @ 07:48)  HR: 100 ( @ 07:48)  BP: 130/77 (- @ 07:48) (127/74 - 134/72)  RR: 18 (- @ 07:48)  SpO2: --  Gen: AAOx3, NAD  Abd: Soft, Nontender, Nondistended, Fundus firm below the umbilicus  Ext: no tender, mild edema  Min Lochia Rubra    Labs:                        11.0   14.11 )-----------( 153      ( 19 Sep 2019 12:08 )             34.2             Tolerating regular diet  Passed flatus, passed bowel movement  Breast/Bottle feeding    Assessment:   24y s/p , PPD, doing well    Plan:  -Routine postpartum care  -Encouraged ambulation and PO hydration  -Tolerating regular diet

## 2019-09-22 VITALS — SYSTOLIC BLOOD PRESSURE: 136 MMHG | DIASTOLIC BLOOD PRESSURE: 70 MMHG

## 2019-09-22 RX ADMIN — Medication 600 MILLIGRAM(S): at 06:36

## 2019-09-22 NOTE — PROGRESS NOTE ADULT - SUBJECTIVE AND OBJECTIVE BOX
Subjective:   Patient doing well. No complaints. Minimal lochia. Pain controlled.  No PIH sx.  Patient anxious to leave and baby has been in High Risk nursery.    Objective:   T(F): 96.4 ( @ 09:20), Max: 98.7 ( @ 12:01)  HR: 63 ( @ 09:20)  BP: 141/66 ( @ 09:20) (125/67 - 143/80)  RR: 18 ( @ 09:20)  SpO2: --  Gen: AAOx3, NAD  Abd: Soft, Nontender, Nondistended, Fundus firm below the umbilicus  Ext: no tender, mild edema  Min Lochia Rubra    Labs:            Tolerating regular diet  Passed flatus, passed bowel movement  Breast/Bottle feeding    Assessment:   24y s/p , PPD doing well    Plan:  -Routine postpartum care  -Encouraged ambulation and PO hydration  -Tolerating regular diet  -Will repeat BP and if stable, will d/c home today with precautions and f/u 48 hrs for bp.

## 2019-09-30 DIAGNOSIS — Z3A.36 36 WEEKS GESTATION OF PREGNANCY: ICD-10-CM

## 2019-09-30 DIAGNOSIS — Z34.93 ENCOUNTER FOR SUPERVISION OF NORMAL PREGNANCY, UNSPECIFIED, THIRD TRIMESTER: ICD-10-CM

## 2020-06-08 NOTE — OB PROVIDER TRIAGE NOTE - NS_CTXBYPALP_OBGYN_ALL_OB
HPI: 85yo male PMH DM, Coronary Artery Disease, chronic kidney disease, PAD presenting with b/l LE pain x 3 weeks, R>L. Patient went to see vascular surgeon, Dr. Sorenson, who did US which showed low flow. Sent to ED for angiogram of RLE and to have loop recorder placed. of note, patient also has chronic anemia requiring frequent blood transfusions. States he is having some exertional dyspnea and orthopnea.     PE:  Gen: NAD  Head: NCAT  HEENT: Oral mucosa moist, normal conjunctiva  CV: RRR no murmurs, normal perfusion, diminished pulse RLE, left DP intact, cap refill <2 seconds, b/l LE warm and well perfused however RLE with purple discoloration  Resp: CTA b/l, no wheezing, rales, rhonchi, no respiratory distress  GI: Abd Soft NTND  Neuro: No focal neuro deficits  MSK: FROM all 4 extremities, no deformity  Skin: No rash, no bruising  Psych: Normal affect    MDM: 85yo male with PAD p/w b/l LE pain, R>L, also with low flow on outpatient sono, to have admission for angiogram of RLE. Also c/o exertional dyspnea, has h/o anemia requiring frequent blood transfusions. Check labs, ekg, cxr, reassess. Denisse Soliz DO         I performed a history and physical exam of the patient and discussed their management with the resident. I reviewed the resident's note and agree with the documented findings and plan of care. My medical decision making and observations are found above. None felt

## 2021-07-01 NOTE — PROCEDURE NOTE - NSPERFORMEDBY_GEN_A_CORE
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   -No source of infection  -spoke with Dr. Mack Ford 6/30, who rec switching Keppra to depakote 250 bid for seizure ppx, as keppra may contribute to pt's agitation. No further w/u planned from neuro perspective at this time, f/u as outpt.   -C/w melatonin qhs, memantine 5mg qhs (started last week by neuro)  -Currently, pt alert and oriented x2-3, calm, resting in stretcher  -Psych consult appreciate- agree with depakote, will splint to 125 qam and 375 qpm. c/w zyprexa prn agitation Anderson/Resident

## 2021-08-10 ENCOUNTER — ASOB RESULT (OUTPATIENT)
Age: 26
End: 2021-08-10

## 2021-08-10 ENCOUNTER — APPOINTMENT (OUTPATIENT)
Dept: ANTEPARTUM | Facility: CLINIC | Age: 26
End: 2021-08-10
Payer: MEDICAID

## 2021-08-10 PROCEDURE — 76817 TRANSVAGINAL US OBSTETRIC: CPT

## 2021-08-10 PROCEDURE — 76811 OB US DETAILED SNGL FETUS: CPT

## 2021-08-12 NOTE — OB RN PATIENT PROFILE - TEMPERATURE IN FAHRENHEIT (DEGREES F)
-1-2+ LE edema  --pt does not wear compression  -continue lasix, cozaar, metoprolol, elevate, limit sodium  -fu with PCP   99.2

## 2021-12-14 ENCOUNTER — INPATIENT (INPATIENT)
Facility: HOSPITAL | Age: 26
LOS: 0 days | Discharge: HOME | End: 2021-12-15
Attending: STUDENT IN AN ORGANIZED HEALTH CARE EDUCATION/TRAINING PROGRAM | Admitting: STUDENT IN AN ORGANIZED HEALTH CARE EDUCATION/TRAINING PROGRAM
Payer: MEDICAID

## 2021-12-14 VITALS
DIASTOLIC BLOOD PRESSURE: 68 MMHG | RESPIRATION RATE: 19 BRPM | TEMPERATURE: 99 F | WEIGHT: 175.05 LBS | HEIGHT: 63 IN | SYSTOLIC BLOOD PRESSURE: 120 MMHG | HEART RATE: 82 BPM

## 2021-12-14 DIAGNOSIS — Q17.8 OTHER SPECIFIED CONGENITAL MALFORMATIONS OF EAR: Chronic | ICD-10-CM

## 2021-12-14 PROCEDURE — 59400 OBSTETRICAL CARE: CPT | Mod: U9

## 2021-12-14 RX ORDER — HYDROCORTISONE 1 %
1 OINTMENT (GRAM) TOPICAL EVERY 6 HOURS
Refills: 0 | Status: DISCONTINUED | OUTPATIENT
Start: 2021-12-14 | End: 2021-12-15

## 2021-12-14 RX ORDER — OXYCODONE HYDROCHLORIDE 5 MG/1
5 TABLET ORAL ONCE
Refills: 0 | Status: DISCONTINUED | OUTPATIENT
Start: 2021-12-14 | End: 2021-12-15

## 2021-12-14 RX ORDER — OXYTOCIN 10 UNIT/ML
333.33 VIAL (ML) INJECTION
Qty: 20 | Refills: 0 | Status: DISCONTINUED | OUTPATIENT
Start: 2021-12-14 | End: 2021-12-14

## 2021-12-14 RX ORDER — SODIUM CHLORIDE 9 MG/ML
1000 INJECTION, SOLUTION INTRAVENOUS
Refills: 0 | Status: DISCONTINUED | OUTPATIENT
Start: 2021-12-14 | End: 2021-12-14

## 2021-12-14 RX ORDER — MAGNESIUM HYDROXIDE 400 MG/1
30 TABLET, CHEWABLE ORAL
Refills: 0 | Status: DISCONTINUED | OUTPATIENT
Start: 2021-12-14 | End: 2021-12-15

## 2021-12-14 RX ORDER — BENZOCAINE 10 %
1 GEL (GRAM) MUCOUS MEMBRANE EVERY 6 HOURS
Refills: 0 | Status: DISCONTINUED | OUTPATIENT
Start: 2021-12-14 | End: 2021-12-15

## 2021-12-14 RX ORDER — NALOXONE HYDROCHLORIDE 4 MG/.1ML
0.1 SPRAY NASAL
Refills: 0 | Status: DISCONTINUED | OUTPATIENT
Start: 2021-12-14 | End: 2021-12-15

## 2021-12-14 RX ORDER — TETANUS TOXOID, REDUCED DIPHTHERIA TOXOID AND ACELLULAR PERTUSSIS VACCINE, ADSORBED 5; 2.5; 8; 8; 2.5 [IU]/.5ML; [IU]/.5ML; UG/.5ML; UG/.5ML; UG/.5ML
0.5 SUSPENSION INTRAMUSCULAR ONCE
Refills: 0 | Status: DISCONTINUED | OUTPATIENT
Start: 2021-12-14 | End: 2021-12-15

## 2021-12-14 RX ORDER — SIMETHICONE 80 MG/1
80 TABLET, CHEWABLE ORAL EVERY 4 HOURS
Refills: 0 | Status: DISCONTINUED | OUTPATIENT
Start: 2021-12-14 | End: 2021-12-15

## 2021-12-14 RX ORDER — CITRIC ACID/SODIUM CITRATE 300-500 MG
15 SOLUTION, ORAL ORAL EVERY 6 HOURS
Refills: 0 | Status: DISCONTINUED | OUTPATIENT
Start: 2021-12-14 | End: 2021-12-14

## 2021-12-14 RX ORDER — OXYTOCIN 10 UNIT/ML
333.33 VIAL (ML) INJECTION
Qty: 20 | Refills: 0 | Status: DISCONTINUED | OUTPATIENT
Start: 2021-12-14 | End: 2021-12-15

## 2021-12-14 RX ORDER — OXYCODONE HYDROCHLORIDE 5 MG/1
5 TABLET ORAL
Refills: 0 | Status: DISCONTINUED | OUTPATIENT
Start: 2021-12-14 | End: 2021-12-15

## 2021-12-14 RX ORDER — SODIUM CHLORIDE 9 MG/ML
3 INJECTION INTRAMUSCULAR; INTRAVENOUS; SUBCUTANEOUS EVERY 8 HOURS
Refills: 0 | Status: DISCONTINUED | OUTPATIENT
Start: 2021-12-14 | End: 2021-12-15

## 2021-12-14 RX ORDER — KETOROLAC TROMETHAMINE 30 MG/ML
30 SYRINGE (ML) INJECTION ONCE
Refills: 0 | Status: DISCONTINUED | OUTPATIENT
Start: 2021-12-14 | End: 2021-12-14

## 2021-12-14 RX ORDER — INFLUENZA VIRUS VACCINE 15; 15; 15; 15 UG/.5ML; UG/.5ML; UG/.5ML; UG/.5ML
0.5 SUSPENSION INTRAMUSCULAR ONCE
Refills: 0 | Status: DISCONTINUED | OUTPATIENT
Start: 2021-12-14 | End: 2021-12-15

## 2021-12-14 RX ORDER — IBUPROFEN 200 MG
600 TABLET ORAL EVERY 6 HOURS
Refills: 0 | Status: COMPLETED | OUTPATIENT
Start: 2021-12-14 | End: 2022-11-12

## 2021-12-14 RX ORDER — ACETAMINOPHEN 500 MG
975 TABLET ORAL
Refills: 0 | Status: DISCONTINUED | OUTPATIENT
Start: 2021-12-14 | End: 2021-12-15

## 2021-12-14 RX ORDER — DIBUCAINE 1 %
1 OINTMENT (GRAM) RECTAL EVERY 6 HOURS
Refills: 0 | Status: DISCONTINUED | OUTPATIENT
Start: 2021-12-14 | End: 2021-12-15

## 2021-12-14 RX ORDER — IBUPROFEN 200 MG
600 TABLET ORAL EVERY 6 HOURS
Refills: 0 | Status: DISCONTINUED | OUTPATIENT
Start: 2021-12-14 | End: 2021-12-15

## 2021-12-14 RX ORDER — FENTANYL/BUPIVACAINE/NS/PF 2MCG/ML-.1
250 PLASTIC BAG, INJECTION (ML) INJECTION
Refills: 0 | Status: DISCONTINUED | OUTPATIENT
Start: 2021-12-14 | End: 2021-12-15

## 2021-12-14 RX ORDER — AER TRAVELER 0.5 G/1
1 SOLUTION RECTAL; TOPICAL EVERY 4 HOURS
Refills: 0 | Status: DISCONTINUED | OUTPATIENT
Start: 2021-12-14 | End: 2021-12-15

## 2021-12-14 RX ORDER — LANOLIN
1 OINTMENT (GRAM) TOPICAL EVERY 6 HOURS
Refills: 0 | Status: DISCONTINUED | OUTPATIENT
Start: 2021-12-14 | End: 2021-12-15

## 2021-12-14 RX ORDER — ONDANSETRON 8 MG/1
4 TABLET, FILM COATED ORAL EVERY 6 HOURS
Refills: 0 | Status: DISCONTINUED | OUTPATIENT
Start: 2021-12-14 | End: 2021-12-15

## 2021-12-14 RX ORDER — DEXAMETHASONE 0.5 MG/5ML
4 ELIXIR ORAL EVERY 6 HOURS
Refills: 0 | Status: DISCONTINUED | OUTPATIENT
Start: 2021-12-14 | End: 2021-12-15

## 2021-12-14 RX ORDER — DIPHENHYDRAMINE HCL 50 MG
25 CAPSULE ORAL EVERY 6 HOURS
Refills: 0 | Status: DISCONTINUED | OUTPATIENT
Start: 2021-12-14 | End: 2021-12-15

## 2021-12-14 RX ADMIN — Medication 30 MILLIGRAM(S): at 15:45

## 2021-12-14 RX ADMIN — Medication 975 MILLIGRAM(S): at 18:01

## 2021-12-14 RX ADMIN — Medication 600 MILLIGRAM(S): at 21:37

## 2021-12-14 NOTE — OB PROVIDER H&P - HISTORY OF PRESENT ILLNESS
Pt is a 26y.o.  @ 40.1wks by 2nd trimester sono presents c/o ctx. Pt denies LOF, VB and reports good FM

## 2021-12-14 NOTE — OB PROVIDER H&P - NSDOBORLOSS1_OBGYN_ALL_OB_DT
Chart reviewed.  Patient states that she is doing better, had some dizziness after OT evaluation  Discussed that she will be followed by PT today    Recommend acute inpatient rehab:PT/OT when medically stable. However if she shows good progress with bedside PT, discharge home with home care may be considered. Continue daily bedside therapy.   d/w CCC. Discussed with RN.    Hospitalist service to reassign care in am 19-Jul-2018

## 2021-12-14 NOTE — OB PROVIDER DELIVERY SUMMARY - NSPROVIDERDELIVERYNOTE_OBGYN_ALL_OB_FT
Patient was fully dilated and pushing. Head delivered OA, restituted to JOVANNY, Anterior shoulder delivered, followed by the posterior shoulder, rest of the body without complications. Baby suctioned, cord clamped and cut, and infant placed on mothers abdomen. Cord segment and blood obtained. Placenta delivered, intact. Fundus massaged and firm, with good hemostasis. Pitocin given postpartum. Vaginal vault, perineum, and cervix inspected, and first degree perineal laceration noted and repaired with 3-0 chromic with a single figure of eight stitch. Live female infant delivered.     Dr. Rosario present at bedside

## 2021-12-14 NOTE — OB PROVIDER DELIVERY SUMMARY - NSSELHIDDEN_OBGYN_ALL_OB_FT
[[email or web address]_ALL_OB_FT:[id number]],[NS_DeliveryRN_OBGYN_ALL_OB_FT:[id number]],[NS_DeliveryAssist1_OBGYN_ALL_OB_FT:[id number]]

## 2021-12-14 NOTE — OB RN PATIENT PROFILE - FALL HARM RISK - UNIVERSAL INTERVENTIONS
Bed in lowest position, wheels locked, appropriate side rails in place/Call bell, personal items and telephone in reach/Instruct patient to call for assistance before getting out of bed or chair/Non-slip footwear when patient is out of bed/[place] to call system/Physically safe environment - no spills, clutter or unnecessary equipment/Purposeful Proactive Rounding/Room/bathroom lighting operational, light cord in reach

## 2021-12-14 NOTE — OB RN DELIVERY SUMMARY - NSSELHIDDEN_OBGYN_ALL_OB_FT
[NS_DeliveryAttending1_OBGYN_ALL_OB_FT:[id number]=],[NS_DeliveryRN_OBGYN_ALL_OB_FT:[id number]=] [NS_DeliveryAttending1_OBGYN_ALL_OB_FT:[id number]=],[NS_DeliveryRN_OBGYN_ALL_OB_FT:[id number]=],[NS_DeliveryAssist1_OBGYN_ALL_OB_FT:[id number]=]

## 2021-12-14 NOTE — OB PROVIDER H&P - NSHPPHYSICALEXAM_GEN_ALL_CORE
HR: 82 (12-14-21 @ 11:13) (82 - 82)  BP: 120/68 (12-14-21 @ 11:13) (120/68 - 120/68)    VE: 7/100/-2 by Dr. Rosario  Ctx: q 4-5 mn  FHR: 135 moderate variability, Cat I

## 2021-12-14 NOTE — OB RN DELIVERY SUMMARY - NS_DELIVERYASSIST1_OBGYN_ALL_OB_FT
Palmira Jarrell MD Bilobed Flap Text: The defect edges were debeveled with a #15 scalpel blade.  Given the location of the defect and the proximity to free margins a bilobe flap was deemed most appropriate.  Using a sterile surgical marker, an appropriate bilobe flap drawn around the defect.    The area thus outlined was incised deep to adipose tissue with a #15 scalpel blade.  The skin margins were undermined to an appropriate distance in all directions utilizing iris scissors.

## 2021-12-14 NOTE — PROCEDURE NOTE - ADDITIONAL PROCEDURE DETAILS
12/14/2021 1245   Called by OB PA for epidural top off. Patient complains of 4/10 abdominal pain with contraction.  Motor/ sensory levels assessed, LT > RT T10.  Epidural catheter negative for heme/ CSF aspiration, 5mL of 0.25% Bupivacaine given.  Pt tolerated well, VSS, FHR 140S, ob RN at bedside. 2021 1245   Called by OB PA for epidural top off. Patient complains of 4/10 abdominal pain with contraction.  Motor/ sensory levels assessed, LT > RT T10.  Epidural catheter negative for heme/ CSF aspiration, 5mL of 0.25% Bupivacaine given.  Pt tolerated well, VSS, FHR 140S, ob RN at bedside.  Patient s/p . V/S stable. No anesthesia complications noted. Patient discharged to postpartum care as per protocol.

## 2021-12-14 NOTE — OB RN PATIENT PROFILE - NS_CIRCUMCISIONPLAN_OBGYN_ALL_OB
Initial SW/CM Assessment/Plan of Care Note     Baseline Assessment  77 year old year old admitted 5/7/2017 as Inpatient with a diagnosis of sepsis likely secondary to healthcare associated PNA, COPD exacerbation. Hx CAD and diabetes. Prior to admission patient was living  Alone and  residing at Apartment.  Patient does  have a Power of  for Healthcare.  Document is not activated.  Copy requested. Patient’s Primary Care Provider is Pcp Not In System.     Medical History  Past Medical History:   Diagnosis Date   • Anemia    • Anxiety     at times   • Arthritis    • COPD (chronic obstructive pulmonary disease)    • Coronary artery disease     (pt reports multiple angioplasties   • Diabetes mellitus    • Essential (primary) hypertension    • Fracture     fingers   • Gastroesophageal reflux disease    • High cholesterol    • Malignant neoplasm     skin cancer- two on nose, one on head, one on back   • Myocardial infarction     Nov 1988, (pt reports multiple MI's)   • Otitis media    • Pneumonia    • RAD (reactive airway disease)    • Rheumatoid arthritis    • Sinusitis, chronic    • Urinary incontinence      Agency/Support  Type of Services Prior to Hospitalization: None  Support Systems: Family members  Home Devices/Equipment: Home Oxygen (T), Nebulizer (T)  Mobility Assist Devices: None  Sensory Support Devices: Eyeglasses, Dentures    Current Status  Current Mental Status: Cooperative, Pleasant    Insurance  Primary: MEDICARE  Secondary: [employer]    Barriers to Discharge  Identified Barriers to Discharge/Transition Planning: Unresolved medical issues    Progress Note  Consult received for d/c planning. Chart review completed. Met with pt - introduced self and SW role. Pt agreeable to SW visit. Pt is A/Ox4. Pt stated he has an Advance Directive. Copy requested for Niesha's records.    Pt lives alone in an apartment. DME includes home oxygen (2L baseline, provided through [employer]) and a nebulizer. No current  home therapy. Pt is independent in his ADLs.    No further social service related needs identified. Please re-consult SW if pt's status changes and/or further needs arise.    Plan  SW/CM - Recommendations for Discharge: Home  Anticipate patient will not need post-hospital services.   Anticipate patient can return to the environment from which patient entered the hospital.   Anticipate patient can provide self-care at discharge.    Refer to SW/CM Flowsheet for Goals and objective data.      N/A

## 2021-12-14 NOTE — OB PROVIDER H&P - ALERT: PERTINENT HISTORY
20 Week Level II Sonogram

## 2021-12-15 ENCOUNTER — TRANSCRIPTION ENCOUNTER (OUTPATIENT)
Age: 26
End: 2021-12-15

## 2021-12-15 VITALS — DIASTOLIC BLOOD PRESSURE: 64 MMHG | HEART RATE: 68 BPM | SYSTOLIC BLOOD PRESSURE: 124 MMHG

## 2021-12-15 LAB
BASOPHILS # BLD AUTO: 0.02 K/UL — SIGNIFICANT CHANGE UP (ref 0–0.2)
BASOPHILS NFR BLD AUTO: 0.2 % — SIGNIFICANT CHANGE UP (ref 0–1)
EOSINOPHIL # BLD AUTO: 0.21 K/UL — SIGNIFICANT CHANGE UP (ref 0–0.7)
EOSINOPHIL NFR BLD AUTO: 2.1 % — SIGNIFICANT CHANGE UP (ref 0–8)
HCT VFR BLD CALC: 29.9 % — LOW (ref 37–47)
HGB BLD-MCNC: 10.2 G/DL — LOW (ref 12–16)
IMM GRANULOCYTES NFR BLD AUTO: 0.4 % — HIGH (ref 0.1–0.3)
LYMPHOCYTES # BLD AUTO: 0.67 K/UL — LOW (ref 1.2–3.4)
LYMPHOCYTES # BLD AUTO: 6.6 % — LOW (ref 20.5–51.1)
MCHC RBC-ENTMCNC: 30.6 PG — SIGNIFICANT CHANGE UP (ref 27–31)
MCHC RBC-ENTMCNC: 34.1 G/DL — SIGNIFICANT CHANGE UP (ref 32–37)
MCV RBC AUTO: 89.8 FL — SIGNIFICANT CHANGE UP (ref 81–99)
MONOCYTES # BLD AUTO: 0.64 K/UL — HIGH (ref 0.1–0.6)
MONOCYTES NFR BLD AUTO: 6.3 % — SIGNIFICANT CHANGE UP (ref 1.7–9.3)
NEUTROPHILS # BLD AUTO: 8.5 K/UL — HIGH (ref 1.4–6.5)
NEUTROPHILS NFR BLD AUTO: 84.4 % — HIGH (ref 42.2–75.2)
NRBC # BLD: 0 /100 WBCS — SIGNIFICANT CHANGE UP (ref 0–0)
PLATELET # BLD AUTO: 246 K/UL — SIGNIFICANT CHANGE UP (ref 130–400)
RBC # BLD: 3.33 M/UL — LOW (ref 4.2–5.4)
RBC # FLD: 14.2 % — SIGNIFICANT CHANGE UP (ref 11.5–14.5)
WBC # BLD: 10.08 K/UL — SIGNIFICANT CHANGE UP (ref 4.8–10.8)
WBC # FLD AUTO: 10.08 K/UL — SIGNIFICANT CHANGE UP (ref 4.8–10.8)

## 2021-12-15 RX ORDER — IBUPROFEN 200 MG
1 TABLET ORAL
Qty: 0 | Refills: 0 | DISCHARGE
Start: 2021-12-15

## 2021-12-15 RX ORDER — ACETAMINOPHEN 500 MG
3 TABLET ORAL
Qty: 0 | Refills: 0 | DISCHARGE
Start: 2021-12-15

## 2021-12-15 RX ADMIN — Medication 600 MILLIGRAM(S): at 09:38

## 2021-12-15 RX ADMIN — Medication 975 MILLIGRAM(S): at 06:26

## 2021-12-15 RX ADMIN — Medication 600 MILLIGRAM(S): at 15:18

## 2021-12-15 RX ADMIN — Medication 600 MILLIGRAM(S): at 03:38

## 2021-12-15 RX ADMIN — Medication 975 MILLIGRAM(S): at 00:40

## 2021-12-15 RX ADMIN — SODIUM CHLORIDE 3 MILLILITER(S): 9 INJECTION INTRAMUSCULAR; INTRAVENOUS; SUBCUTANEOUS at 06:34

## 2021-12-15 RX ADMIN — SODIUM CHLORIDE 3 MILLILITER(S): 9 INJECTION INTRAMUSCULAR; INTRAVENOUS; SUBCUTANEOUS at 00:41

## 2021-12-15 NOTE — DISCHARGE NOTE OB - CONTRAINDICATIONS & PRECAUTIONS (SELECT ALL THAT APPLY)
Symptomatic with suspected or confirmed COVID-19. Defer administration of Influenza Vaccine at this time

## 2021-12-15 NOTE — DISCHARGE NOTE OB - NS MD DC FALL RISK RISK
For information on Fall & Injury Prevention, visit: [email or web address] OR  [email or web address] OR  https://www.cdc.gov/steadi/patient.html

## 2021-12-15 NOTE — DISCHARGE NOTE OB - PATIENT PORTAL LINK FT
You can access the FollowMyHealth Patient Portal offered by [facility] by registering at the following website: [email or web address]. By joining [employer]’s FollowMyHealth portal, you will also be able to view your health information using other applications (apps) compatible with our system.

## 2021-12-15 NOTE — DISCHARGE NOTE OB - CARE PROVIDER_API CALL
Clint Rosario)  OBGYN [address] [place] [address]  Phone: [phone]  Fax: [phone]  Follow Up Time:

## 2021-12-20 DIAGNOSIS — Z3A.40 40 WEEKS GESTATION OF PREGNANCY: ICD-10-CM

## 2022-07-13 NOTE — OB RN DELIVERY SUMMARY - NS_GBSRECENTABX_OBGYN_ALL_OB_DT
17 yr old female c/o vaginal bleeding. A+O x 4. Numerous non-immediate family at the bedside. Pt reports she had a selective  2 weeks ago. Pt reports she had vaginal bleeding that was lightening as expected until  night when it increased. Pt was seen at the OBGYN and had an ultrasound in which she was told there were 2 small clots. Pt reports the vaginal bleeding is heavier with clots noted during vaginal discharge of blood. LMP 22. Denies back, pain, CP, sob, palpitations, NVD. Abdomen mildly tender on exam. skin warm dry and intact. will continue to monitor. 19-Jul-2018 05:30

## 2022-11-12 NOTE — OB RN PATIENT PROFILE - NS_TRIAGEARRIVAL_OBGYN_ALL_OB_DT
You can access the FollowMyHealth Patient Portal offered by [facility] by registering at the following website: [email or web address]. By joining [employer]’s FollowMyHealth portal, you will also be able to view your health information using other applications (apps) compatible with our system.
18-Sep-2019 11:51

## 2022-12-12 DIAGNOSIS — Z78.9 OTHER SPECIFIED HEALTH STATUS: ICD-10-CM

## 2022-12-12 RX ORDER — DROSPIRENONE 4 MG/1
4 TABLET, FILM COATED ORAL
Qty: 3 | Refills: 0 | Status: ACTIVE | COMMUNITY
Start: 2022-12-12 | End: 1900-01-01

## 2023-01-24 NOTE — OB RN DELIVERY SUMMARY - AMNIOTIC FLUID COLOR, LABOR
Spoke with Laisha Diehl at pt.'s facility at [phone]. Informed her patient saw HARVEY today and has a paper order for labs to be drawn in 2 weeks.  She V/U
clear

## 2023-02-22 ENCOUNTER — RX RENEWAL (OUTPATIENT)
Age: 28
End: 2023-02-22

## 2023-03-22 ENCOUNTER — APPOINTMENT (OUTPATIENT)
Dept: OBGYN | Facility: CLINIC | Age: 28
End: 2023-03-22

## 2023-04-01 ENCOUNTER — RX RENEWAL (OUTPATIENT)
Age: 28
End: 2023-04-01

## 2023-04-01 RX ORDER — DROSPIRENONE 4 MG/1
4 TABLET, FILM COATED ORAL
Qty: 28 | Refills: 1 | Status: ACTIVE | COMMUNITY
Start: 2023-02-21 | End: 1900-01-01

## 2023-06-20 ENCOUNTER — APPOINTMENT (OUTPATIENT)
Dept: OBGYN | Facility: CLINIC | Age: 28
End: 2023-06-20
Payer: MEDICAID

## 2023-06-20 VITALS
WEIGHT: 143 LBS | SYSTOLIC BLOOD PRESSURE: 122 MMHG | BODY MASS INDEX: 24.41 KG/M2 | HEIGHT: 64 IN | DIASTOLIC BLOOD PRESSURE: 84 MMHG

## 2023-06-20 DIAGNOSIS — Z01.419 ENCOUNTER FOR GYNECOLOGICAL EXAMINATION (GENERAL) (ROUTINE) W/OUT ABNORMAL FINDINGS: ICD-10-CM

## 2023-06-20 LAB
BILIRUB UR QL STRIP: NORMAL
GLUCOSE UR-MCNC: NORMAL
HCG UR QL: 0.2 EU/DL
HGB UR QL STRIP.AUTO: NORMAL
KETONES UR-MCNC: NORMAL
LEUKOCYTE ESTERASE UR QL STRIP: NORMAL
NITRITE UR QL STRIP: NORMAL
PH UR STRIP: 7
PROT UR STRIP-MCNC: NORMAL
SP GR UR STRIP: 1.02

## 2023-06-20 PROCEDURE — 76817 TRANSVAGINAL US OBSTETRIC: CPT

## 2023-06-20 PROCEDURE — 81003 URINALYSIS AUTO W/O SCOPE: CPT | Mod: QW

## 2023-06-20 PROCEDURE — 99213 OFFICE O/P EST LOW 20 MIN: CPT | Mod: 25

## 2023-06-20 PROCEDURE — 99395 PREV VISIT EST AGE 18-39: CPT

## 2023-06-20 NOTE — PROCEDURE
[Transvaginal OB Sonogram] : Transvaginal OB Sonogram [Intrauterine Pregnancy] : intrauterine pregnancy [Yolk Sac] : yolk sac present [Fetal Heart] : fetal heart present [Date: ___] : Date: [unfilled] [Current GA by Sonogram: ___ (wks)] : Current GA by Sonogram: [unfilled]Uwks [___ day(s)] : [unfilled] days [Transvaginal OB Sonogram WNL] : Transvaginal OB Sonogram WNL [FreeTextEntry1] : crl 8.4 pos fh, ps fm edc based on sono 01/25/2024

## 2023-06-20 NOTE — PHYSICAL EXAM
[Chaperone Present] : A chaperone was present in the examining room during all aspects of the physical examination [FreeTextEntry1] : rosales  [Appropriately responsive] : appropriately responsive [Alert] : alert [No Acute Distress] : no acute distress [No Murmurs] : no murmurs [Soft] : soft [Non-tender] : non-tender [Non-distended] : non-distended [No HSM] : No HSM [No Lesions] : no lesions [No Mass] : no mass [Oriented x3] : oriented x3 [Examination Of The Breasts] : a normal appearance [No Masses] : no breast masses were palpable [Labia Majora] : normal [Labia Minora] : normal [Normal] : normal [Uterine Adnexae] : normal

## 2023-06-20 NOTE — HISTORY OF PRESENT ILLNESS
[FreeTextEntry1] : pt come in for annaul exam and evaluation of pos preg test \par was breasfeeding when got pregnnt , no bleeding since last preg\par   x 3 , largest 8-2 lbs, no complications, \par no meds\par  no sob, no cp, full rom, no dysuria\par  urine dip neg for uti\par urine dip pos for preg

## 2023-06-20 NOTE — PLAN
[FreeTextEntry1] : annaul exam \par g cchls entf rom the office \par prenatal care edc based on osno 01/25/2024\par declines gentics\par n/v 4 week \par drawn prenatal labs next visit

## 2023-06-21 LAB
C TRACH RRNA SPEC QL NAA+PROBE: NOT DETECTED
N GONORRHOEA RRNA SPEC QL NAA+PROBE: NOT DETECTED
SOURCE AMPLIFICATION: NORMAL

## 2023-09-19 ENCOUNTER — NON-APPOINTMENT (OUTPATIENT)
Age: 28
End: 2023-09-19

## 2023-09-19 ENCOUNTER — APPOINTMENT (OUTPATIENT)
Dept: OBGYN | Facility: CLINIC | Age: 28
End: 2023-09-19
Payer: MEDICAID

## 2023-09-19 ENCOUNTER — ASOB RESULT (OUTPATIENT)
Age: 28
End: 2023-09-19

## 2023-09-19 ENCOUNTER — APPOINTMENT (OUTPATIENT)
Dept: ANTEPARTUM | Facility: CLINIC | Age: 28
End: 2023-09-19
Payer: MEDICAID

## 2023-09-19 VITALS — SYSTOLIC BLOOD PRESSURE: 120 MMHG | DIASTOLIC BLOOD PRESSURE: 76 MMHG | BODY MASS INDEX: 26.61 KG/M2 | WEIGHT: 155 LBS

## 2023-09-19 DIAGNOSIS — I83.90 ASYMPTOMATIC VARICOSE VEINS OF UNSPECIFIED LOWER EXTREMITY: ICD-10-CM

## 2023-09-19 LAB
BILIRUB UR QL STRIP: NORMAL
GLUCOSE UR-MCNC: NORMAL
HCG UR QL: 0.2 EU/DL
HGB UR QL STRIP.AUTO: NORMAL
KETONES UR-MCNC: NORMAL
LEUKOCYTE ESTERASE UR QL STRIP: NORMAL
NITRITE UR QL STRIP: NORMAL
PH UR STRIP: 6.5
PROT UR STRIP-MCNC: NORMAL
SP GR UR STRIP: 1.01

## 2023-09-19 PROCEDURE — 0501F PRENATAL FLOW SHEET: CPT

## 2023-09-19 PROCEDURE — 81003 URINALYSIS AUTO W/O SCOPE: CPT | Mod: QW

## 2023-09-19 PROCEDURE — 76811 OB US DETAILED SNGL FETUS: CPT

## 2023-09-19 RX ORDER — ASPIRIN ENTERIC COATED TABLETS 81 MG 81 MG/1
81 TABLET, DELAYED RELEASE ORAL DAILY
Qty: 30 | Refills: 0 | Status: ACTIVE | COMMUNITY
Start: 2023-09-19 | End: 1900-01-01

## 2023-09-28 ENCOUNTER — ASOB RESULT (OUTPATIENT)
Age: 28
End: 2023-09-28

## 2023-09-28 ENCOUNTER — APPOINTMENT (OUTPATIENT)
Dept: ANTEPARTUM | Facility: CLINIC | Age: 28
End: 2023-09-28
Payer: MEDICAID

## 2023-09-28 PROCEDURE — 76816 OB US FOLLOW-UP PER FETUS: CPT

## 2023-10-18 ENCOUNTER — APPOINTMENT (OUTPATIENT)
Dept: OBGYN | Facility: CLINIC | Age: 28
End: 2023-10-18
Payer: MEDICAID

## 2023-10-18 ENCOUNTER — LABORATORY RESULT (OUTPATIENT)
Age: 28
End: 2023-10-18

## 2023-10-18 VITALS — DIASTOLIC BLOOD PRESSURE: 77 MMHG | BODY MASS INDEX: 28.49 KG/M2 | SYSTOLIC BLOOD PRESSURE: 126 MMHG | WEIGHT: 166 LBS

## 2023-10-18 LAB
BILIRUB UR QL STRIP: NORMAL
GLUCOSE UR-MCNC: NORMAL
HCG UR QL: 0.2 EU/DL
HGB UR QL STRIP.AUTO: NORMAL
KETONES UR-MCNC: NORMAL
LEUKOCYTE ESTERASE UR QL STRIP: NORMAL
NITRITE UR QL STRIP: NORMAL
PH UR STRIP: 6
PROT UR STRIP-MCNC: NORMAL
SP GR UR STRIP: 1.01

## 2023-10-18 PROCEDURE — 0502F SUBSEQUENT PRENATAL CARE: CPT

## 2023-10-18 PROCEDURE — 81003 URINALYSIS AUTO W/O SCOPE: CPT | Mod: NC,QW

## 2023-10-19 LAB
ABO + RH PNL BLD: NORMAL
BLD GP AB SCN SERPL QL: NORMAL
GLUCOSE 1H P 50 G GLC PO SERPL-MCNC: 86 MG/DL
HBV SURFACE AG SER QL: NONREACTIVE
HCV AB SER QL: NONREACTIVE
HCV S/CO RATIO: 0.18 S/CO
HIV1+2 AB SPEC QL IA.RAPID: NONREACTIVE
T PALLIDUM AB SER QL IA: NEGATIVE

## 2023-10-20 LAB
BACTERIA UR CULT: NORMAL
LEAD BLD-MCNC: <1 UG/DL
MEV IGG FLD QL IA: 103 AU/ML
MEV IGG+IGM SER-IMP: POSITIVE
MUV AB SER-ACNC: POSITIVE
MUV IGG SER QL IA: 33 AU/ML
RUBV IGG FLD-ACNC: 2.4 INDEX
RUBV IGG SER-IMP: POSITIVE
VZV AB TITR SER: POSITIVE
VZV IGG SER IF-ACNC: 1516 INDEX

## 2023-10-22 LAB
B19V IGG SER QL IA: 6.37 INDEX
B19V IGG+IGM SER-IMP: NORMAL
B19V IGG+IGM SER-IMP: POSITIVE
B19V IGM FLD-ACNC: 0.1 INDEX
B19V IGM SER-ACNC: NEGATIVE

## 2023-10-23 ENCOUNTER — NON-APPOINTMENT (OUTPATIENT)
Age: 28
End: 2023-10-23

## 2023-10-23 LAB
BASOPHILS # BLD AUTO: 0.05 K/UL
BASOPHILS NFR BLD AUTO: 0.4 %
EOSINOPHIL # BLD AUTO: 0.06 K/UL
EOSINOPHIL NFR BLD AUTO: 0.5 %
HCT VFR BLD CALC: 37.2 %
HGB BLD-MCNC: 11.1 G/DL
IMM GRANULOCYTES NFR BLD AUTO: 1.9 %
LYMPHOCYTES # BLD AUTO: 2.11 K/UL
LYMPHOCYTES NFR BLD AUTO: 17.7 %
MAN DIFF?: NORMAL
MCHC RBC-ENTMCNC: 27.2 PG
MCHC RBC-ENTMCNC: 29.8 GM/DL
MCV RBC AUTO: 91.2 FL
MONOCYTES # BLD AUTO: 0.79 K/UL
MONOCYTES NFR BLD AUTO: 6.6 %
NEUTROPHILS # BLD AUTO: 8.66 K/UL
NEUTROPHILS NFR BLD AUTO: 72.9 %
PLATELET # BLD AUTO: 163 K/UL
RBC # BLD: 4.08 M/UL
RBC # FLD: 14.5 %
WBC # FLD AUTO: 11.9 K/UL

## 2023-11-16 ENCOUNTER — APPOINTMENT (OUTPATIENT)
Dept: OBGYN | Facility: CLINIC | Age: 28
End: 2023-11-16
Payer: MEDICAID

## 2023-11-16 ENCOUNTER — TRANSCRIPTION ENCOUNTER (OUTPATIENT)
Age: 28
End: 2023-11-16

## 2023-11-16 VITALS — DIASTOLIC BLOOD PRESSURE: 78 MMHG | WEIGHT: 170 LBS | BODY MASS INDEX: 29.18 KG/M2 | SYSTOLIC BLOOD PRESSURE: 122 MMHG

## 2023-11-16 LAB
BILIRUB UR QL STRIP: NORMAL
GLUCOSE UR-MCNC: NORMAL
HCG UR QL: 0.2 EU/DL
HGB UR QL STRIP.AUTO: NORMAL
KETONES UR-MCNC: NORMAL
LEUKOCYTE ESTERASE UR QL STRIP: NORMAL
NITRITE UR QL STRIP: NORMAL
PH UR STRIP: 6.5
PROT UR STRIP-MCNC: NORMAL
SP GR UR STRIP: 1.02

## 2023-11-16 PROCEDURE — 90471 IMMUNIZATION ADMIN: CPT

## 2023-11-16 PROCEDURE — 0502F SUBSEQUENT PRENATAL CARE: CPT

## 2023-11-16 PROCEDURE — 90715 TDAP VACCINE 7 YRS/> IM: CPT

## 2023-11-16 PROCEDURE — 81002 URINALYSIS NONAUTO W/O SCOPE: CPT | Mod: NC

## 2023-12-08 ENCOUNTER — APPOINTMENT (OUTPATIENT)
Dept: OBGYN | Facility: CLINIC | Age: 28
End: 2023-12-08

## 2023-12-12 ENCOUNTER — APPOINTMENT (OUTPATIENT)
Dept: OBGYN | Facility: CLINIC | Age: 28
End: 2023-12-12
Payer: MEDICAID

## 2023-12-12 VITALS — WEIGHT: 179 LBS | BODY MASS INDEX: 30.73 KG/M2 | SYSTOLIC BLOOD PRESSURE: 125 MMHG | DIASTOLIC BLOOD PRESSURE: 77 MMHG

## 2023-12-12 PROCEDURE — 81003 URINALYSIS AUTO W/O SCOPE: CPT | Mod: NC,QW

## 2023-12-12 PROCEDURE — 0502F SUBSEQUENT PRENATAL CARE: CPT

## 2024-01-02 ENCOUNTER — APPOINTMENT (OUTPATIENT)
Dept: OBGYN | Facility: CLINIC | Age: 29
End: 2024-01-02
Payer: MEDICAID

## 2024-01-02 ENCOUNTER — RESULT CHARGE (OUTPATIENT)
Age: 29
End: 2024-01-02

## 2024-01-02 VITALS — WEIGHT: 179 LBS | BODY MASS INDEX: 30.73 KG/M2 | DIASTOLIC BLOOD PRESSURE: 81 MMHG | SYSTOLIC BLOOD PRESSURE: 118 MMHG

## 2024-01-02 DIAGNOSIS — Z00.00 ENCOUNTER FOR GENERAL ADULT MEDICAL EXAMINATION W/OUT ABNORMAL FINDINGS: ICD-10-CM

## 2024-01-02 LAB
BILIRUB UR QL STRIP: NORMAL
GLUCOSE UR-MCNC: NORMAL
HCG UR QL: 0.2 EU/DL
HGB UR QL STRIP.AUTO: NORMAL
KETONES UR-MCNC: NORMAL
LEUKOCYTE ESTERASE UR QL STRIP: NORMAL
NITRITE UR QL STRIP: NORMAL
PH UR STRIP: 6
PROT UR STRIP-MCNC: NORMAL
SP GR UR STRIP: >=1.03

## 2024-01-02 PROCEDURE — 76816 OB US FOLLOW-UP PER FETUS: CPT

## 2024-01-02 PROCEDURE — 0502F SUBSEQUENT PRENATAL CARE: CPT

## 2024-01-03 LAB
25(OH)D3 SERPL-MCNC: 35.9 NG/ML
FOLATE SERPL-MCNC: 16.5 NG/ML
HCT VFR BLD CALC: 35.5 %
HGB BLD-MCNC: 11.1 G/DL
HIV1+2 AB SPEC QL IA.RAPID: NONREACTIVE
MCHC RBC-ENTMCNC: 26.8 PG
MCHC RBC-ENTMCNC: 31.3 GM/DL
MCV RBC AUTO: 85.7 FL
PLATELET # BLD AUTO: 169 K/UL
RBC # BLD: 4.14 M/UL
RBC # FLD: 14.5 %
T PALLIDUM AB SER QL IA: NEGATIVE
VIT B12 SERPL-MCNC: 531 PG/ML
WBC # FLD AUTO: 11.54 K/UL

## 2024-01-05 LAB — B-HEM STREP SPEC QL CULT: NORMAL

## 2024-01-10 ENCOUNTER — NON-APPOINTMENT (OUTPATIENT)
Age: 29
End: 2024-01-10

## 2024-01-10 ENCOUNTER — APPOINTMENT (OUTPATIENT)
Dept: OBGYN | Facility: CLINIC | Age: 29
End: 2024-01-10
Payer: MEDICAID

## 2024-01-10 VITALS — BODY MASS INDEX: 30.55 KG/M2 | DIASTOLIC BLOOD PRESSURE: 78 MMHG | WEIGHT: 178 LBS | SYSTOLIC BLOOD PRESSURE: 119 MMHG

## 2024-01-10 LAB
BILIRUB UR QL STRIP: NORMAL
GLUCOSE UR-MCNC: NORMAL
HCG UR QL: 0.2 EU/DL
HGB UR QL STRIP.AUTO: NORMAL
KETONES UR-MCNC: NORMAL
LEUKOCYTE ESTERASE UR QL STRIP: NORMAL
NITRITE UR QL STRIP: NORMAL
PH UR STRIP: 6.5
PROT UR STRIP-MCNC: NORMAL
SP GR UR STRIP: >=1.03

## 2024-01-10 PROCEDURE — 81003 URINALYSIS AUTO W/O SCOPE: CPT | Mod: NC,QW

## 2024-01-10 PROCEDURE — 0502F SUBSEQUENT PRENATAL CARE: CPT

## 2024-01-12 ENCOUNTER — APPOINTMENT (OUTPATIENT)
Dept: OBGYN | Facility: CLINIC | Age: 29
End: 2024-01-12
Payer: MEDICAID

## 2024-01-12 PROCEDURE — 81003 URINALYSIS AUTO W/O SCOPE: CPT | Mod: QW

## 2024-01-13 LAB — BACTERIA UR CULT: ABNORMAL

## 2024-01-15 ENCOUNTER — NON-APPOINTMENT (OUTPATIENT)
Age: 29
End: 2024-01-15

## 2024-01-16 ENCOUNTER — APPOINTMENT (OUTPATIENT)
Dept: OBGYN | Facility: CLINIC | Age: 29
End: 2024-01-16

## 2024-01-16 LAB
CREAT 24H UR-MCNC: 0.7 G/24 H
CREAT ?TM UR-MCNC: 57 MG/DL
PROT 24H UR-MRATE: 11 MG/DL
PROT ?TM UR-MCNC: 24 HR
PROT UR-MCNC: 126 MG/24 H
SPECIMEN VOL 24H UR: 1150 ML

## 2024-01-17 ENCOUNTER — APPOINTMENT (OUTPATIENT)
Dept: OBGYN | Facility: CLINIC | Age: 29
End: 2024-01-17
Payer: MEDICAID

## 2024-01-17 VITALS — WEIGHT: 178 LBS | DIASTOLIC BLOOD PRESSURE: 78 MMHG | BODY MASS INDEX: 30.55 KG/M2 | SYSTOLIC BLOOD PRESSURE: 121 MMHG

## 2024-01-17 DIAGNOSIS — Z34.90 ENCOUNTER FOR SUPERVISION OF NORMAL PREGNANCY, UNSPECIFIED, UNSPECIFIED TRIMESTER: ICD-10-CM

## 2024-01-17 PROCEDURE — 0502F SUBSEQUENT PRENATAL CARE: CPT

## 2024-01-17 PROCEDURE — 81003 URINALYSIS AUTO W/O SCOPE: CPT | Mod: NC,QW

## 2024-01-23 ENCOUNTER — NON-APPOINTMENT (OUTPATIENT)
Age: 29
End: 2024-01-23

## 2024-01-23 ENCOUNTER — INPATIENT (INPATIENT)
Facility: HOSPITAL | Age: 29
LOS: 0 days | Discharge: HOME | DRG: 560 | End: 2024-01-24
Attending: OBSTETRICS & GYNECOLOGY | Admitting: OBSTETRICS & GYNECOLOGY
Payer: MEDICAID

## 2024-01-23 VITALS
HEART RATE: 113 BPM | RESPIRATION RATE: 16 BRPM | TEMPERATURE: 98 F | DIASTOLIC BLOOD PRESSURE: 77 MMHG | SYSTOLIC BLOOD PRESSURE: 137 MMHG

## 2024-01-23 DIAGNOSIS — O26.899 OTHER SPECIFIED PREGNANCY RELATED CONDITIONS, UNSPECIFIED TRIMESTER: ICD-10-CM

## 2024-01-23 DIAGNOSIS — Q17.8 OTHER SPECIFIED CONGENITAL MALFORMATIONS OF EAR: Chronic | ICD-10-CM

## 2024-01-23 DIAGNOSIS — O26.893 OTHER SPECIFIED PREGNANCY RELATED CONDITIONS, THIRD TRIMESTER: ICD-10-CM

## 2024-01-23 LAB
AMPHET UR-MCNC: NEGATIVE — SIGNIFICANT CHANGE UP
APPEARANCE UR: ABNORMAL
BACTERIA # UR AUTO: ABNORMAL /HPF
BARBITURATES UR SCN-MCNC: NEGATIVE — SIGNIFICANT CHANGE UP
BASOPHILS # BLD AUTO: 0.05 K/UL — SIGNIFICANT CHANGE UP (ref 0–0.2)
BASOPHILS NFR BLD AUTO: 0.4 % — SIGNIFICANT CHANGE UP (ref 0–1)
BENZODIAZ UR-MCNC: NEGATIVE — SIGNIFICANT CHANGE UP
BILIRUB UR-MCNC: NEGATIVE — SIGNIFICANT CHANGE UP
BLD GP AB SCN SERPL QL: SIGNIFICANT CHANGE UP
BUPRENORPHINE SCREEN, URINE RESULT: NEGATIVE — SIGNIFICANT CHANGE UP
CAST: 3 /LPF — SIGNIFICANT CHANGE UP (ref 0–4)
COCAINE METAB.OTHER UR-MCNC: NEGATIVE — SIGNIFICANT CHANGE UP
COLOR SPEC: YELLOW — SIGNIFICANT CHANGE UP
DIFF PNL FLD: NEGATIVE — SIGNIFICANT CHANGE UP
EOSINOPHIL # BLD AUTO: 0.02 K/UL — SIGNIFICANT CHANGE UP (ref 0–0.7)
EOSINOPHIL NFR BLD AUTO: 0.1 % — SIGNIFICANT CHANGE UP (ref 0–8)
FENTANYL UR QL: NEGATIVE — SIGNIFICANT CHANGE UP
GLUCOSE UR QL: NEGATIVE MG/DL — SIGNIFICANT CHANGE UP
HCT VFR BLD CALC: 34.7 % — LOW (ref 37–47)
HGB BLD-MCNC: 11.6 G/DL — LOW (ref 12–16)
IMM GRANULOCYTES NFR BLD AUTO: 1 % — HIGH (ref 0.1–0.3)
KETONES UR-MCNC: ABNORMAL MG/DL
L&D DRUG SCREEN, URINE: SIGNIFICANT CHANGE UP
LEUKOCYTE ESTERASE UR-ACNC: ABNORMAL
LYMPHOCYTES # BLD AUTO: 1.98 K/UL — SIGNIFICANT CHANGE UP (ref 1.2–3.4)
LYMPHOCYTES # BLD AUTO: 14.2 % — LOW (ref 20.5–51.1)
MCHC RBC-ENTMCNC: 27 PG — SIGNIFICANT CHANGE UP (ref 27–31)
MCHC RBC-ENTMCNC: 33.4 G/DL — SIGNIFICANT CHANGE UP (ref 32–37)
MCV RBC AUTO: 80.7 FL — LOW (ref 81–99)
METHADONE UR-MCNC: NEGATIVE — SIGNIFICANT CHANGE UP
MONOCYTES # BLD AUTO: 1.06 K/UL — HIGH (ref 0.1–0.6)
MONOCYTES NFR BLD AUTO: 7.6 % — SIGNIFICANT CHANGE UP (ref 1.7–9.3)
NEUTROPHILS # BLD AUTO: 10.68 K/UL — HIGH (ref 1.4–6.5)
NEUTROPHILS NFR BLD AUTO: 76.7 % — HIGH (ref 42.2–75.2)
NITRITE UR-MCNC: NEGATIVE — SIGNIFICANT CHANGE UP
NRBC # BLD: 0 /100 WBCS — SIGNIFICANT CHANGE UP (ref 0–0)
OPIATES UR-MCNC: NEGATIVE — SIGNIFICANT CHANGE UP
OXYCODONE UR-MCNC: NEGATIVE — SIGNIFICANT CHANGE UP
PCP UR-MCNC: NEGATIVE — SIGNIFICANT CHANGE UP
PH UR: 7 — SIGNIFICANT CHANGE UP (ref 5–8)
PLATELET # BLD AUTO: 179 K/UL — SIGNIFICANT CHANGE UP (ref 130–400)
PMV BLD: 11.8 FL — HIGH (ref 7.4–10.4)
PRENATAL SYPHILIS TEST: SIGNIFICANT CHANGE UP
PROPOXYPHENE QUALITATIVE URINE RESULT: NEGATIVE — SIGNIFICANT CHANGE UP
PROT UR-MCNC: SIGNIFICANT CHANGE UP MG/DL
RBC # BLD: 4.3 M/UL — SIGNIFICANT CHANGE UP (ref 4.2–5.4)
RBC # FLD: 15.2 % — HIGH (ref 11.5–14.5)
RBC CASTS # UR COMP ASSIST: 2 /HPF — SIGNIFICANT CHANGE UP (ref 0–4)
SP GR SPEC: 1.02 — SIGNIFICANT CHANGE UP (ref 1–1.03)
SQUAMOUS # UR AUTO: 22 /HPF — HIGH (ref 0–5)
UROBILINOGEN FLD QL: 1 MG/DL — SIGNIFICANT CHANGE UP (ref 0.2–1)
WBC # BLD: 13.93 K/UL — HIGH (ref 4.8–10.8)
WBC # FLD AUTO: 13.93 K/UL — HIGH (ref 4.8–10.8)
WBC UR QL: 11 /HPF — HIGH (ref 0–5)

## 2024-01-23 PROCEDURE — 86850 RBC ANTIBODY SCREEN: CPT

## 2024-01-23 PROCEDURE — 85025 COMPLETE CBC W/AUTO DIFF WBC: CPT

## 2024-01-23 PROCEDURE — 59050 FETAL MONITOR W/REPORT: CPT

## 2024-01-23 PROCEDURE — 80307 DRUG TEST PRSMV CHEM ANLYZR: CPT

## 2024-01-23 PROCEDURE — 81001 URINALYSIS AUTO W/SCOPE: CPT

## 2024-01-23 PROCEDURE — 86592 SYPHILIS TEST NON-TREP QUAL: CPT

## 2024-01-23 PROCEDURE — 86901 BLOOD TYPING SEROLOGIC RH(D): CPT

## 2024-01-23 PROCEDURE — 59400 OBSTETRICAL CARE: CPT | Mod: U9

## 2024-01-23 PROCEDURE — 36415 COLL VENOUS BLD VENIPUNCTURE: CPT

## 2024-01-23 PROCEDURE — 86900 BLOOD TYPING SEROLOGIC ABO: CPT

## 2024-01-23 PROCEDURE — 80354 DRUG SCREENING FENTANYL: CPT

## 2024-01-23 RX ORDER — OXYTOCIN 10 UNIT/ML
333.33 VIAL (ML) INJECTION
Qty: 20 | Refills: 0 | Status: DISCONTINUED | OUTPATIENT
Start: 2024-01-23 | End: 2024-01-24

## 2024-01-23 RX ORDER — CITRIC ACID/SODIUM CITRATE 300-500 MG
15 SOLUTION, ORAL ORAL EVERY 6 HOURS
Refills: 0 | Status: DISCONTINUED | OUTPATIENT
Start: 2024-01-23 | End: 2024-01-23

## 2024-01-23 RX ORDER — KETOROLAC TROMETHAMINE 30 MG/ML
30 SYRINGE (ML) INJECTION ONCE
Refills: 0 | Status: DISCONTINUED | OUTPATIENT
Start: 2024-01-23 | End: 2024-01-23

## 2024-01-23 RX ORDER — ONDANSETRON 8 MG/1
4 TABLET, FILM COATED ORAL EVERY 6 HOURS
Refills: 0 | Status: DISCONTINUED | OUTPATIENT
Start: 2024-01-23 | End: 2024-01-24

## 2024-01-23 RX ORDER — OXYCODONE HYDROCHLORIDE 5 MG/1
5 TABLET ORAL
Refills: 0 | Status: DISCONTINUED | OUTPATIENT
Start: 2024-01-23 | End: 2024-01-24

## 2024-01-23 RX ORDER — ACETAMINOPHEN 500 MG
975 TABLET ORAL
Refills: 0 | Status: DISCONTINUED | OUTPATIENT
Start: 2024-01-23 | End: 2024-01-24

## 2024-01-23 RX ORDER — IBUPROFEN 200 MG
600 TABLET ORAL EVERY 6 HOURS
Refills: 0 | Status: DISCONTINUED | OUTPATIENT
Start: 2024-01-23 | End: 2024-01-24

## 2024-01-23 RX ORDER — SODIUM CHLORIDE 9 MG/ML
3 INJECTION INTRAMUSCULAR; INTRAVENOUS; SUBCUTANEOUS EVERY 8 HOURS
Refills: 0 | Status: DISCONTINUED | OUTPATIENT
Start: 2024-01-23 | End: 2024-01-24

## 2024-01-23 RX ORDER — FENTANYL/BUPIVACAINE/NS/PF 2MCG/ML-.1
250 PLASTIC BAG, INJECTION (ML) INJECTION
Refills: 0 | Status: DISCONTINUED | OUTPATIENT
Start: 2024-01-23 | End: 2024-01-24

## 2024-01-23 RX ORDER — DIBUCAINE 1 %
1 OINTMENT (GRAM) RECTAL EVERY 6 HOURS
Refills: 0 | Status: DISCONTINUED | OUTPATIENT
Start: 2024-01-23 | End: 2024-01-24

## 2024-01-23 RX ORDER — SIMETHICONE 80 MG/1
80 TABLET, CHEWABLE ORAL EVERY 4 HOURS
Refills: 0 | Status: DISCONTINUED | OUTPATIENT
Start: 2024-01-23 | End: 2024-01-24

## 2024-01-23 RX ORDER — HYDROCORTISONE 1 %
1 OINTMENT (GRAM) TOPICAL EVERY 6 HOURS
Refills: 0 | Status: DISCONTINUED | OUTPATIENT
Start: 2024-01-23 | End: 2024-01-24

## 2024-01-23 RX ORDER — MAGNESIUM HYDROXIDE 400 MG/1
30 TABLET, CHEWABLE ORAL
Refills: 0 | Status: DISCONTINUED | OUTPATIENT
Start: 2024-01-23 | End: 2024-01-24

## 2024-01-23 RX ORDER — AER TRAVELER 0.5 G/1
1 SOLUTION RECTAL; TOPICAL EVERY 4 HOURS
Refills: 0 | Status: DISCONTINUED | OUTPATIENT
Start: 2024-01-23 | End: 2024-01-24

## 2024-01-23 RX ORDER — OXYTOCIN 10 UNIT/ML
41.67 VIAL (ML) INJECTION
Qty: 20 | Refills: 0 | Status: DISCONTINUED | OUTPATIENT
Start: 2024-01-23 | End: 2024-01-24

## 2024-01-23 RX ORDER — NALOXONE HYDROCHLORIDE 4 MG/.1ML
0.1 SPRAY NASAL
Refills: 0 | Status: DISCONTINUED | OUTPATIENT
Start: 2024-01-23 | End: 2024-01-24

## 2024-01-23 RX ORDER — TETANUS TOXOID, REDUCED DIPHTHERIA TOXOID AND ACELLULAR PERTUSSIS VACCINE, ADSORBED 5; 2.5; 8; 8; 2.5 [IU]/.5ML; [IU]/.5ML; UG/.5ML; UG/.5ML; UG/.5ML
0.5 SUSPENSION INTRAMUSCULAR ONCE
Refills: 0 | Status: DISCONTINUED | OUTPATIENT
Start: 2024-01-23 | End: 2024-01-24

## 2024-01-23 RX ORDER — IBUPROFEN 200 MG
600 TABLET ORAL EVERY 6 HOURS
Refills: 0 | Status: COMPLETED | OUTPATIENT
Start: 2024-01-23 | End: 2024-12-21

## 2024-01-23 RX ORDER — SODIUM CHLORIDE 9 MG/ML
1000 INJECTION, SOLUTION INTRAVENOUS
Refills: 0 | Status: DISCONTINUED | OUTPATIENT
Start: 2024-01-23 | End: 2024-01-23

## 2024-01-23 RX ORDER — LANOLIN
1 OINTMENT (GRAM) TOPICAL EVERY 6 HOURS
Refills: 0 | Status: DISCONTINUED | OUTPATIENT
Start: 2024-01-23 | End: 2024-01-24

## 2024-01-23 RX ORDER — OXYCODONE HYDROCHLORIDE 5 MG/1
5 TABLET ORAL ONCE
Refills: 0 | Status: DISCONTINUED | OUTPATIENT
Start: 2024-01-23 | End: 2024-01-24

## 2024-01-23 RX ORDER — BENZOCAINE 10 %
1 GEL (GRAM) MUCOUS MEMBRANE EVERY 6 HOURS
Refills: 0 | Status: DISCONTINUED | OUTPATIENT
Start: 2024-01-23 | End: 2024-01-24

## 2024-01-23 RX ORDER — DEXAMETHASONE 0.5 MG/5ML
4 ELIXIR ORAL EVERY 6 HOURS
Refills: 0 | Status: DISCONTINUED | OUTPATIENT
Start: 2024-01-23 | End: 2024-01-24

## 2024-01-23 RX ORDER — DIPHENHYDRAMINE HCL 50 MG
25 CAPSULE ORAL EVERY 6 HOURS
Refills: 0 | Status: DISCONTINUED | OUTPATIENT
Start: 2024-01-23 | End: 2024-01-24

## 2024-01-23 RX ORDER — PRAMOXINE HYDROCHLORIDE 150 MG/15G
1 AEROSOL, FOAM RECTAL EVERY 4 HOURS
Refills: 0 | Status: DISCONTINUED | OUTPATIENT
Start: 2024-01-23 | End: 2024-01-24

## 2024-01-23 RX ORDER — INFLUENZA VIRUS VACCINE 15; 15; 15; 15 UG/.5ML; UG/.5ML; UG/.5ML; UG/.5ML
0.5 SUSPENSION INTRAMUSCULAR ONCE
Refills: 0 | Status: COMPLETED | OUTPATIENT
Start: 2024-01-23 | End: 2024-01-23

## 2024-01-23 RX ADMIN — Medication 1000 MILLIUNIT(S)/MIN: at 09:36

## 2024-01-23 RX ADMIN — Medication 975 MILLIGRAM(S): at 16:45

## 2024-01-23 RX ADMIN — Medication 30 MILLIGRAM(S): at 10:13

## 2024-01-23 RX ADMIN — Medication 600 MILLIGRAM(S): at 18:30

## 2024-01-23 RX ADMIN — Medication 975 MILLIGRAM(S): at 21:21

## 2024-01-23 RX ADMIN — SODIUM CHLORIDE 125 MILLILITER(S): 9 INJECTION, SOLUTION INTRAVENOUS at 06:05

## 2024-01-23 RX ADMIN — Medication 975 MILLIGRAM(S): at 16:01

## 2024-01-23 RX ADMIN — Medication 600 MILLIGRAM(S): at 17:42

## 2024-01-23 RX ADMIN — Medication 975 MILLIGRAM(S): at 22:00

## 2024-01-23 RX ADMIN — SODIUM CHLORIDE 3 MILLILITER(S): 9 INJECTION INTRAMUSCULAR; INTRAVENOUS; SUBCUTANEOUS at 21:44

## 2024-01-23 RX ADMIN — Medication 30 MILLIGRAM(S): at 09:35

## 2024-01-23 NOTE — OB PROVIDER H&P - ASSESSMENT
A/P: 27 yo  @39w5d, GBS neg, in labor  -Admit to L+D  -Monitor EFM and TOCO   -IVF and labs  -Pain control PRN  -Clear liquid diet as tolerated  -epidural when requested    d/w Dr. Flores and Dr. Pinto

## 2024-01-23 NOTE — OB PROVIDER DELIVERY SUMMARY - NSLOWPPHRISK_OBGYN_A_OB
Wound care Nurse Consult: consult from Dr Crescencio Royal stating \"decub ulcer\". Patient is a slightly confused [address] [place] y/o CF admitted for COPD exacerbation with bilateral lung masses. Past Medical History:  
Diagnosis Date  Arthritis  Asthma  COPD   
 2 L NC  
 Hypertension  Ischemic colitis ([facility] [address] 1/2012  Migraines  Neurological disorder   
 migraines  Psychiatric disorder   
 depression  Seizures ([facility] [address] Last seizure 4 years ago  Vaginal candidiasis 1/2012 Patient has intact blanchable skin with erythema to her sacral buttocks area caused by urinary and fecal incontinence. Purewick in use Patient able to turn self on side in bed upon request 
[facility]: 
 
Buttocks/sacral: please clean gently with soap and water, pat dry and apply ES Desitin zinc cream to heal and protect skin.  
 
Brunilda Burleson RN, [employer] 
 
 No previous uterine incision/[place]all Pregnancy/Less than or equal to 4 previous vaginal births/No known bleeding disorder/No history of postpartum hemorrhage/No other PPH risks indicated

## 2024-01-23 NOTE — OB PROVIDER DELIVERY SUMMARY - NSSELHIDDEN_OBGYN_ALL_OB_FT
[NS_DeliveryAttending1_OBGYN_ALL_OB_FT:[id number]],[NS_DeliveryRN_OBGYN_ALL_OB_FT:[id number]=]

## 2024-01-23 NOTE — OB RN PATIENT PROFILE - NS_MODEOFARRIVAL_OBGYN_ALL_OB
PHYSICAL THERAPY - DAILY TREATMENT NOTE  Patient Name: Jadiel Weaver        Date: 2017  : 1953   [x]  Patient  Verified  Visit #:      36  Insurance: Payor: [place] MED[place] / Plan: [place] MED[place]ARE PART A & B / Product Type: Medicare /      In time:   900          Out time:   1000 Total Treatment Time (min):   60   Medicare Time Tracking (below)   Total Timed Codes (min):  60 1:1 Treatment Time:  60     SUBJECTIVE    Pain Level (on 0 to 10 scale):  6  / 10   Medication Changes/New allergies or changes in medical history, any new surgeries or procedures? []  No    []  Yes   If yes, update Summary List:    Subjective Functional Status/Changes:  []  No changes reported   HISTORY    Present Symptoms: soreness and pain in L shoulder    Date of Surgery: 17  What caused need for surgery?  Rotator cuff tear    Constant symptoms: Aching in the anterior RC and on incision site     Disturbed night: [] No    [x] Yes    Pain at rest: [] No    [x] Yes       Treatments this episode: Prescription meds    Previous episodes: N/A  Previous treatment: N/A; first surgery on L shoulder      Paraesthesia: [x] No    [] Yes       Work:  Retired Mechanical Stresses: N/A    Leisure: ADLs (gardening, housework, etc.) Mechanical Stresses: Any activity that pulls on shoulder or AROM of shoulder    Functional disability from present episode: See above    Functional disability score:See QD         OBJECTIVE      Physical Therapy Evaluation - Shoulder    Posture: [] Poor    [] Fair    [x] Good    Describe:    ROM:  [] Unable to assess at this time                                           AROM                                                              PROM   Left Right  Left Right   Flexion  Flexion 87 NT   Extension NT 65 Extension NT NT   Scaption/ABD  Scaption/ NT   ER @ 0 Degrees NT NT ER @ 0 Degrees NT NT   ER @ 90 Degrees NT 89 ER @ 90 Degrees NT NT   IR @ 90 Degrees NT 81 IR @ 90 Degrees NT NT   IR HBB NT NT IR HBB NT NT     End Feel / Painful Arc:    Strength:   [x] Unable to assess at this time                                                                           L (1-5) R (1-5) Pain   Flexors   [] Yes   [] No   Abductors   [] Yes   [] No   External Rotators   [] Yes   [] No   Internal Rotators   [] Yes   [] No   Supraspinatus   [] Yes   [] No   Serratus Anterior   [] Yes   [] No   Lower Trapezius   [] Yes   [] No   Elbow Flexion   [] Yes   [] No   Elbow Extension   [] Yes   [] No       Modalities Rationale:    min [] Estim, type/location:                                      []  att     []  unatt     []  w/US     []  w/ice    []  w/heat    min []  Mechanical Traction: type/lbs                   []  pro   []  sup   []  int   []  cont    []  before manual    []  after manual    min []  Ultrasound, settings/location:      min []  Iontophoresis w/ dexamethasone, location:                                               []  take home patch       []  in clinic   10 min [x]  Ice     []  Heat    location/position: L shoulder supine    min []  Vasopneumatic Device, press/temp:     min []  Other:    [] Skin assessment post-treatment (if applicable):    []  intact    []  redness- no adverse reaction     []redness  adverse reaction:      20 min Therapeutic Exercise:  [x]  See flow sheet   Rationale:      increase ROM, increase strength, improve coordination and increase proprioception to improve the patients functionality of L shoulder       10 min Therapeutic Activity: Donning/doffing sling, transfers, sleeping position. Rationale:    ensure safety in healing        Rationale:        min Patient Education:  YES  Reviewed HEP   []  Progressed/Changed HEP based on: Other Objective/Functional Measures:    Pt able to perform therex without commenting on excessive pain. Pt noted difficulty with performing of pendulums stating \"it is difficult to relax my arm.  I feel like I am not doing it right\"     Post Treatment Pain Level (on 0 to 10) scale:   0  / 10     ASSESSMENT    Assessment/Changes in Function:     See POC     []  See Progress Note/Recertification   Patient will continue to benefit from skilled PT services to analyze,, cue,, progress,, modify,, demonstrate,, instruct, and address, movement patterns,, therapeutic interventions,, postural abnormalities,, soft tissue restrictions,, ROM,, strength,, functional mobility,, body mechanics/ergonomics, and home and community integration, to attain remaining goals. Progress toward goals / Updated goals:  n/a     PLAN     [x]  Upgrade activities as tolerated YES Continue plan of care   []  Discharge due to :    [x]  Other: Initiate POC     Therapist: Ashleigh Delgado PT, Cert.  MDT    Date: 6/26/2017 Time: 9:09 AM Car

## 2024-01-23 NOTE — OB RN DELIVERY SUMMARY - NS_SEPSISRSKCALC_OBGYN_ALL_OB_FT
EOS calculated successfully. EOS Risk Factor: 0.5/1000 live births ([facility] national incidence); GA=39w5d; Temp=98.24; ROM=1.233; GBS='Negative'; Antibiotics='No antibiotics or any antibiotics < 2 hrs prior to birth'

## 2024-01-23 NOTE — PROCEDURE NOTE - ADDITIONAL PROCEDURE DETAILS
0630 Called to pt room for epidural  Procedure reviewed, risks/benefits/alternatives discussed and consent obtained  NIKKY 6cm Catheter threaded to 10cm  Loaded with 100mcg Fentanyl, 5ml 1.5% lidocaine w/epi test dose and clinician bolus 8ml 0.0625% Bupi w/Fentanyl  Patient states relief  VSS/FHR WNL

## 2024-01-23 NOTE — OB PROVIDER H&P - ATTENDING COMMENTS
29 y/o p2103 at 39.5 wks, w/c/o irreg ctx, good fm, no rom,     nsd x 3, largest 8-2 lbs, one at 36 weeks  abd: j=9922 g, nt  ext: no edea  cx: 5/80/-1/i/adeq on admission    admit, analgesia, arom, anticipate nsd

## 2024-01-23 NOTE — OB PROVIDER H&P - NSLOWPPHRISK_OBGYN_A_OB
No previous uterine incision/Stovall Pregnancy/Less than or equal to 4 previous vaginal births/No known bleeding disorder/No history of postpartum hemorrhage/No other PPH risks indicated

## 2024-01-23 NOTE — OB PROVIDER H&P - NSHPPHYSICALEXAM_GEN_ALL_CORE
Vital Signs Last 24 Hrs  T(C): 36.8 (23 Jan 2024 05:32), Max: 36.8 (23 Jan 2024 05:17)  T(F): 98.2 (23 Jan 2024 05:32), Max: 98.2 (23 Jan 2024 05:17)  HR: 113 (23 Jan 2024 05:32) (113 - 113)  BP: 137/77 (23 Jan 2024 05:32) (137/77 - 137/77)  RR: 16 (23 Jan 2024 05:32) (16 - 16)    Gen: NAD, sitting comfortably  Abd: Gravid, soft, NT, palpable ctx  SVE: 5/80/-2, vtx, intact  EFM: 130/mod/+accels  [place]: Q4m  Sono: cephalic

## 2024-01-23 NOTE — OB PROVIDER H&P - HISTORY OF PRESENT ILLNESS
27 yo  @39w5d, presents for contractions. Pt reports contractions since midnight, now 8/10 intensity. reports +FM, denies LOf or VB. Pregnancy complicated by isolated mild proteinuria, s/p normal OP workup, 24hr UTP normal, no elevated BPs. Denies other complications. GBS neg.

## 2024-01-23 NOTE — OB PROVIDER H&P - NSHPLABSRESULTS_GEN_ALL_CORE
Labs  GCT 86  HBsAG NR  Hep C NR  MMR immune  varicella immune  AB pos  HIV NR    1/12 24 hr     GBS neg (1/2)    Sono  @36w EFW 3111g (58%)  @20w - normal anatomy

## 2024-01-23 NOTE — PROCEDURE NOTE - NSANESMONIT_OBGYN_ALL_OB
SPO2, HR/Non-Invasive Blood Pressure Monitoring/Routine Monitoring/Fetal Heart Rate Monitor/Other, please specify

## 2024-01-23 NOTE — OB PROVIDER DELIVERY SUMMARY - NSEBL_OBGYN_ALL_OB_NU
HISTORY OF PRESENT ILLNESS  Jeri Fregoso is a 76 y.o. female. Cold Symptoms   The current episode started more than 1 week ago (4 months ). Cough characteristics: no cough  Associated symptoms include rhinorrhea and sore throat. Pertinent negatives include no chest pain, no chills, no ear congestion, no ear pain and no shortness of breath. Treatments tried: flonase intermittent  She is not a smoker. Past medical history comments: allergic rhinitis. Neck Pain   Reports several months of intermittent tightness associated with anxiety and exertion. Anxiety  Patient is seen for anxiety disorder. Current treatment includes Ativan- takes twice a week on Tuesday and Friday and no other therapies. She did not schedule with a therapist.   Ongoing symptoms include: none. Patient denies: suicidal ideation, homocidal ideation. Reported side effects from the treatment: none.        Review of Systems   Constitutional: Negative for chills. HENT: Positive for congestion, rhinorrhea and sore throat. Negative for ear pain. Respiratory: Negative for cough and shortness of breath. Cardiovascular: Negative for chest pain. Gastrointestinal: Positive for heartburn.      Patient Active Problem List    Diagnosis Date Noted    Osteoarthritis involving multiple joints on both sides of body 11/16/2016    Mitral valve prolapse, nonrheumatic 07/20/2016    Obesity (BMI 30-39.9) 04/20/2016    Chronic prescription benzodiazepine use 04/20/2016    FELIX on CPAP 07/10/2015    IBS (irritable bowel syndrome) 11/05/2014    Vitamin D deficiency 04/24/2014    Urge incontinence of urine 01/16/2014    Atrial premature depolarization 01/16/2014    Anxiety disorder due to general medical condition 01/16/2014    Post-nasal drip 01/16/2014    Fecal incontinence 01/16/2014    Diabetes mellitus type 2, controlled ([facility] [address] 12/20/2011       Current Outpatient Prescriptions   Medication Sig Dispense Refill    metFORMIN (GLUCOPHAGE) 500 mg tablet 500mg Qam, 1000mg Qpm 270 Tab 3    Blood-Glucose Meter monitoring kit Check BG 2 times/day; onetouch meter; E11.9 1 Kit 0    glucose blood VI test strips (BLOOD GLUCOSE TEST) strip Check BG 2 times/day 100 Strip 11    glipiZIDE (GLUCOTROL) 5 mg tablet Take 1 and half tabs with breakfast and 1 tab with dinner 225 Tab 1    diclofenac (VOLTAREN) 1 % gel Apply  to affected area four (4) times daily.  fluticasone (FLONASE) 50 mcg/actuation nasal spray 2 Sprays by Both Nostrils route daily. 1 Bottle 0    amLODIPine (NORVASC) 2.5 mg tablet Take 2.5 mg by mouth daily. Pt unsure of dosage      cyanocobalamin (VITAMIN B-12) 500 mcg tablet Take 500 mcg by mouth two (2) times a day.  finasteride (PROSCAR) 5 mg tablet Take 5 mg by mouth daily.  biotin 2,500 mcg tab Take 2,500 Units by mouth.  Cholecalciferol, Vitamin D3, (VITAMIN D3) 1,000 unit cap Take 1,000 Units by mouth.  MULTIVITAMIN PO Take 1 Tab by mouth daily.  NAPROXEN SODIUM (ALEVE PO) Take 220 mg by mouth daily as needed.  aspirin delayed-release 81 mg tablet Take  by mouth daily. Every other day      amoxicillin (AMOXIL) 875 mg tablet TAKE ONE TABLET BY MOUTH TWICE A DAY UNTIL GONE  0    HYDROcodone-acetaminophen (NORCO)  mg tablet TAKE ONE TABLET BY MOUTH EVERY 4 TO 6 HOURS AS NEEDED FOR PAIN  0    LORazepam (ATIVAN) 0.5 mg tablet Take 0.5 mg by mouth daily as needed. Allergies   Allergen Reactions    Januvia [Sitagliptin] Rash    B12 [Cyanocobalamin-Cobamamide] Hives     w/injection    Decongestant [Brompheniramine Maleate] Other (comments)     heart      Visit Vitals    /60 (BP 1 Location: Right arm, BP Patient Position: Sitting)    Pulse 83    Temp 98.3 °F (36.8 °C) (Oral)    Resp 16    Ht 4' 11\" (1.499 m)    Wt 167 lb (75.8 kg)    SpO2 97%    BMI 33.73 kg/m2       Physical Exam   Constitutional: She is oriented to person, place, and time.  She appears 48 well-developed. No distress. HENT:   Mallampati IV   Cobblestoning and abundant posterior mucus drainage      Eyes: Conjunctivae are normal.   Neck: Neck supple. Thyromegaly (no nodules ) present. Cardiovascular: Normal rate, regular rhythm and normal heart sounds. Pulmonary/Chest: Effort normal and breath sounds normal. No respiratory distress. She has no wheezes. She has no rales. She exhibits no tenderness. Lymphadenopathy:     She has no cervical adenopathy. Neurological: She is alert and oriented to person, place, and time. Skin: Skin is warm. Psychiatric:   Anxious        ASSESSMENT and PLAN  Diagnoses and all orders for this visit:    1. Throat tightness-Ms. Desiree Munroe presents with several months of intermittent throat tightness that she associates with exertion and anxiety. She is concerned that this is coming from her carotid arteries however 2016 carotid ultrasound showed no evidence of plaques. Her history and physical are more consistent with either focal spasms, neck spasms or even GERD plus or minus a stricture given her report of intermittent food dysphagia. Will also rule out thyroid mass. See AVS for full details of plan and patient discussion.     -     REFERRAL TO ENT-OTOLARYNGOLOGY  -     US THYROID/PARATHYROID/SOFT TISS; Future    2. Anxiety disorder due to general medical condition lengthy discussion about the role of anxiety on her health and medical well-being. She has tapered her Ativan use to 2 times per week but admits to intermittent anxiety in between she has been on bupropion in the past but did not feel as if it helped with her anxiety. Will try low-dose of Zoloft and titrate as needed. Also benefit from seeing a therapist resources were given for this. Patient Education:  Reviewed concept of anxiety as biochemical imbalance of neurotransmitters and rationale for treatment.  Instructed patient to contact office or 911 promptly should condition worsen or any new symptoms appear and provided on-call telephone numbers. IF THE PATIENT HAS ANY SUICIDAL OR HOMICIDAL IDEATION, CALL THE OFFICE, DISCUSS WITH A SUPPORT MEMBER OR GO TO THE ER IMMEDIATELY. Patient was agreeable with this    -     sertraline (ZOLOFT) 25 mg tablet; Take 1 Tab by mouth daily. 3. PND (post-nasal drip)-optimize allergy regimen.  -     fexofenadine (ALLEGRA) 180 mg tablet; Take 1 Tab by mouth daily. 4. Controlled type 2 diabetes mellitus without complication, without long-term current use of insulin (HCC)management per endocrinology.  -     LIPID PANEL  -     METABOLIC PANEL, COMPREHENSIVE      Follow-up Disposition:  Return in about 4 months (around 7/29/2018) for Physical - 30 minute appointment, Medicare Wellness. Medication risks/benefits/costs/interactions/alternatives discussed with patient. Adandelonne Leonora  was given an after visit summary which includes diagnoses, current medications, & vitals. she expressed understanding with the diagnosis and plan.

## 2024-01-23 NOTE — OB RN PATIENT PROFILE - NS_OBGYNHISTORY_OBGYN_ALL_OB_FT
G1 36wks  6lb1oz no comp  G2 FT  7lb6oz  no comp  G3 FT  8-2lb, no complications    Denies STI, abnl PAP, fibroids, cysts

## 2024-01-23 NOTE — PROCEDURE NOTE - NSTESTDOSEDET_OBGYN_ALL_OB
bowel obstruction    hx SBO revision twice  repeat CT noted  planned for egd Monday  clear liquid diet  NPO after midnight  discussed with patient and primary team Negative for evidence of intravascular injection or CSF

## 2024-01-23 NOTE — OB PROVIDER DELIVERY SUMMARY - NSPROVIDERDELIVERYNOTE_OBGYN_ALL_OB_FT
Patient fully dilated, OA, pushed to deliver viable male .  Apgar 9/9.  Placenta intact with 3vc.  Cervix, vagina intact.  First degree perineal laceration repaired with 3-0 chromic.  Tolerated well.

## 2024-01-23 NOTE — OB RN DELIVERY SUMMARY - NSSELHIDDEN_OBGYN_ALL_OB_FT
[NS_DeliveryAttending1_OBGYN_ALL_OB_FT:[id number]=],[NS_DeliveryRN_OBGYN_ALL_OB_FT:[id number]=]

## 2024-01-24 ENCOUNTER — TRANSCRIPTION ENCOUNTER (OUTPATIENT)
Age: 29
End: 2024-01-24

## 2024-01-24 ENCOUNTER — APPOINTMENT (OUTPATIENT)
Dept: OBGYN | Facility: CLINIC | Age: 29
End: 2024-01-24

## 2024-01-24 VITALS
DIASTOLIC BLOOD PRESSURE: 85 MMHG | TEMPERATURE: 98 F | RESPIRATION RATE: 18 BRPM | HEART RATE: 69 BPM | SYSTOLIC BLOOD PRESSURE: 154 MMHG

## 2024-01-24 LAB
BASOPHILS # BLD AUTO: 0.05 K/UL — SIGNIFICANT CHANGE UP (ref 0–0.2)
BASOPHILS NFR BLD AUTO: 0.4 % — SIGNIFICANT CHANGE UP (ref 0–1)
EOSINOPHIL # BLD AUTO: 0.06 K/UL — SIGNIFICANT CHANGE UP (ref 0–0.7)
EOSINOPHIL NFR BLD AUTO: 0.5 % — SIGNIFICANT CHANGE UP (ref 0–8)
HCT VFR BLD CALC: 32 % — LOW (ref 37–47)
HGB BLD-MCNC: 10.4 G/DL — LOW (ref 12–16)
IMM GRANULOCYTES NFR BLD AUTO: 1 % — HIGH (ref 0.1–0.3)
LYMPHOCYTES # BLD AUTO: 18.2 % — LOW (ref 20.5–51.1)
LYMPHOCYTES # BLD AUTO: 2.08 K/UL — SIGNIFICANT CHANGE UP (ref 1.2–3.4)
MCHC RBC-ENTMCNC: 26.5 PG — LOW (ref 27–31)
MCHC RBC-ENTMCNC: 32.5 G/DL — SIGNIFICANT CHANGE UP (ref 32–37)
MCV RBC AUTO: 81.4 FL — SIGNIFICANT CHANGE UP (ref 81–99)
MONOCYTES # BLD AUTO: 1.35 K/UL — HIGH (ref 0.1–0.6)
MONOCYTES NFR BLD AUTO: 11.8 % — HIGH (ref 1.7–9.3)
NEUTROPHILS # BLD AUTO: 7.77 K/UL — HIGH (ref 1.4–6.5)
NEUTROPHILS NFR BLD AUTO: 68.1 % — SIGNIFICANT CHANGE UP (ref 42.2–75.2)
NRBC # BLD: 0 /100 WBCS — SIGNIFICANT CHANGE UP (ref 0–0)
PLATELET # BLD AUTO: 153 K/UL — SIGNIFICANT CHANGE UP (ref 130–400)
PMV BLD: 12.1 FL — HIGH (ref 7.4–10.4)
RBC # BLD: 3.93 M/UL — LOW (ref 4.2–5.4)
RBC # FLD: 15 % — HIGH (ref 11.5–14.5)
WBC # BLD: 11.42 K/UL — HIGH (ref 4.8–10.8)
WBC # FLD AUTO: 11.42 K/UL — HIGH (ref 4.8–10.8)

## 2024-01-24 RX ADMIN — Medication 600 MILLIGRAM(S): at 01:19

## 2024-01-24 RX ADMIN — Medication 600 MILLIGRAM(S): at 13:23

## 2024-01-24 RX ADMIN — Medication 600 MILLIGRAM(S): at 11:44

## 2024-01-24 RX ADMIN — SODIUM CHLORIDE 3 MILLILITER(S): 9 INJECTION INTRAMUSCULAR; INTRAVENOUS; SUBCUTANEOUS at 13:23

## 2024-01-24 RX ADMIN — Medication 975 MILLIGRAM(S): at 08:31

## 2024-01-24 RX ADMIN — Medication 600 MILLIGRAM(S): at 02:00

## 2024-01-24 NOTE — DISCHARGE NOTE OB - CARE PROVIDER_API CALL
Wendy Grajeda  Obstetrics and Gynecology  [address] [place] [address]  Phone: [phone]  Fax: [phone]  Follow Up Time:

## 2024-01-24 NOTE — PROGRESS NOTE ADULT - SUBJECTIVE AND OBJECTIVE BOX
Pt doing well, pain well controlled. Denies heavy vaginal bleeding. No overnight events, no acute complaints. Ambulating without difficulty, voiding, and tolerating regular diet. Breastfeeding.    PAST MEDICAL & SURGICAL HISTORY:  No pertinent past medical history      Eustachian tube anomaly          Physical Exam  Vital Signs Last 24 Hrs  T(F): 97.9 (24 Jan 2024 09:02), Max: 98.8 (23 Jan 2024 10:59)  HR: 69 (24 Jan 2024 09:02) (68 - 109)  BP: 154/85 (24 Jan 2024 09:02) (109/55 - 154/85)  RR: 18 (24 Jan 2024 09:02) (18 - 18)    Gen: AAOx3, NAD  Abd: Soft, nontender, nondistended  Fundus: Firm, nontender, below the umbilicus  Lochia: Minimal  Ext: No calf tenderness, no swelling    Labs:                        10.4   11.42 )-----------( 153      ( 24 Jan 2024 06:51 )             32.0                         11.6   13.93 )-----------( 179      ( 23 Jan 2024 06:20 )             34.7

## 2024-01-24 NOTE — DISCHARGE NOTE OB - PATIENT PORTAL LINK FT
You can access the FollowMyHealth Patient Portal offered by [facility] by registering at the following website: [email or web address]. By joining [email or web address]’s FollowMyHealth portal, you will also be able to view your health information using other applications (apps) compatible with our system.

## 2024-01-24 NOTE — DISCHARGE NOTE OB - NS MD DC FALL RISK RISK
For information on Fall & Injury Prevention, visit: [email or web address] OR  [email or web address] OR  https://www.cdc.gov/steadi/patient.html

## 2024-02-01 DIAGNOSIS — Z3A.39 39 WEEKS GESTATION OF PREGNANCY: ICD-10-CM

## 2024-03-12 ENCOUNTER — APPOINTMENT (OUTPATIENT)
Dept: OBGYN | Facility: CLINIC | Age: 29
End: 2024-03-12
Payer: MEDICAID

## 2024-03-12 VITALS — WEIGHT: 166 LBS | DIASTOLIC BLOOD PRESSURE: 92 MMHG | BODY MASS INDEX: 28.49 KG/M2 | SYSTOLIC BLOOD PRESSURE: 141 MMHG

## 2024-03-12 DIAGNOSIS — Z30.430 ENCOUNTER FOR INSERTION OF INTRAUTERINE CONTRACEPTIVE DEVICE: ICD-10-CM

## 2024-03-12 LAB
BILIRUB UR QL STRIP: NORMAL
GLUCOSE UR-MCNC: NORMAL
HCG UR QL: 0.2 EU/DL
HCG UR QL: NEGATIVE
HGB UR QL STRIP.AUTO: NORMAL
KETONES UR-MCNC: NORMAL
LEUKOCYTE ESTERASE UR QL STRIP: NORMAL
NITRITE UR QL STRIP: NORMAL
PH UR STRIP: 6
PROT UR STRIP-MCNC: NORMAL
SP GR UR STRIP: 1.02

## 2024-03-12 PROCEDURE — 81025 URINE PREGNANCY TEST: CPT

## 2024-03-12 PROCEDURE — 0503F POSTPARTUM CARE VISIT: CPT

## 2024-03-12 PROCEDURE — 76830 TRANSVAGINAL US NON-OB: CPT

## 2024-03-12 PROCEDURE — 99213 OFFICE O/P EST LOW 20 MIN: CPT | Mod: 25

## 2024-03-12 PROCEDURE — 81003 URINALYSIS AUTO W/O SCOPE: CPT | Mod: NC,QW

## 2024-03-12 PROCEDURE — 58300 INSERT INTRAUTERINE DEVICE: CPT

## 2024-03-12 NOTE — PROCEDURE
[IUD Placement] : intrauterine device (IUD) placement [Time out performed] : Pre-procedure time out performed.  Patient's name, date of birth and procedure confirmed. [Consent Obtained] : Consent obtained [Prevention of Pregnancy] : prevention of pregnancy [Risks] : risks [Benefits] : benefits [Alternatives] : alternatives [Patient] : patient [Infection] : infection [Bleeding] : bleeding [Pain] : pain [Expulsion] : expulsion [Failure] : failure [Uterine Perforation] : uterine perforation [Neg Pregnancy Test] : negative pregnancy test [Betadine] : Betadine [Tenaculum] : Tenaculum [Tolerated Well] : Patient tolerated the procedure well [No Complications] : No complications [None] : None

## 2024-03-12 NOTE — PLAN
[FreeTextEntry1] : pp exam doing well  pap sent  ParaGard placed, pt tolreated procedure well  placement verified by sono  follow up in 4-6 weeks

## 2024-03-12 NOTE — HISTORY OF PRESENT ILLNESS
[FreeTextEntry1] : pt comes in for a pp visit s/p  baby was 7-13 breastfeeding  no vag bleeding  wants ParaGard for birth control  no s/s of pp depression  no meds  urine dip neg for uti  urine dip neg for preg

## 2024-03-12 NOTE — PHYSICAL EXAM
[Chaperone Present] : A chaperone was present in the examining room during all aspects of the physical examination [Appropriately responsive] : appropriately responsive [Alert] : alert [No Acute Distress] : no acute distress [Soft] : soft [Non-tender] : non-tender [Non-distended] : non-distended [No HSM] : No HSM [No Mass] : no mass [No Lesions] : no lesions [Oriented x3] : oriented x3 [Examination Of The Breasts] : a normal appearance [No Masses] : no breast masses were palpable [Labia Majora] : normal [Labia Minora] : normal [Normal] : normal [Uterine Adnexae] : normal

## 2024-03-13 LAB
C TRACH RRNA SPEC QL NAA+PROBE: NOT DETECTED
HPV HIGH+LOW RISK DNA PNL CVX: NOT DETECTED
N GONORRHOEA RRNA SPEC QL NAA+PROBE: NOT DETECTED
SOURCE TP AMPLIFICATION: NORMAL

## 2024-03-15 LAB — CYTOLOGY CVX/VAG DOC THIN PREP: NORMAL

## 2024-04-17 ENCOUNTER — APPOINTMENT (OUTPATIENT)
Dept: OBGYN | Facility: CLINIC | Age: 29
End: 2024-04-17
Payer: MEDICAID

## 2024-04-17 VITALS — BODY MASS INDEX: 28.84 KG/M2 | DIASTOLIC BLOOD PRESSURE: 81 MMHG | WEIGHT: 168 LBS | SYSTOLIC BLOOD PRESSURE: 118 MMHG

## 2024-04-17 DIAGNOSIS — Z30.431 ENCOUNTER FOR ROUTINE CHECKING OF INTRAUTERINE CONTRACEPTIVE DEVICE: ICD-10-CM

## 2024-04-17 PROCEDURE — 99213 OFFICE O/P EST LOW 20 MIN: CPT

## 2024-04-17 PROCEDURE — 81003 URINALYSIS AUTO W/O SCOPE: CPT | Mod: QW

## 2024-04-17 PROCEDURE — 76830 TRANSVAGINAL US NON-OB: CPT

## 2024-04-17 PROCEDURE — 81025 URINE PREGNANCY TEST: CPT

## 2024-04-17 NOTE — COUNSELING
[Breast Self Exam] : breast self exam [Bladder Hygiene] : bladder hygiene [Contraception/ Emergency Contraception/ Safe Sexual Practices] : contraception, emergency contraception, safe sexual practices [Pregnancy Options] : pregnancy options

## 2024-04-17 NOTE — HISTORY OF PRESENT ILLNESS
[FreeTextEntry1] : pt comes in for iud check  s/p placement on 03/12/2024 no complaints  had a menstraul cycle then spotted foir48 hrs about 2 weeka fter period  nothing since then no sob, no cp, full rom, no dysuria  no meds  urine dip neg for uti urine dip neg for preg

## 2024-04-17 NOTE — PLAN
[FreeTextEntry1] : iud check iud in place  verified by sono  normal gyn anatomy  follow up prn annual

## 2024-04-17 NOTE — PROCEDURE
[Locate IUD] : locate IUD [Transvaginal Ultrasound] : transvaginal ultrasound [FreeTextEntry3] : iud in place . tip st the fundus, normal uterus, normal ovaries b/l

## 2024-05-01 RX ORDER — DROSPIRENONE 4 MG/1
4 TABLET, FILM COATED ORAL
Qty: 1 | Refills: 1 | Status: ACTIVE | COMMUNITY
Start: 2024-05-01 | End: 1900-01-01

## 2024-05-20 NOTE — OB PROVIDER TRIAGE NOTE - NSHPLABSRESULTS_GEN_ALL_CORE
Attending to bill Attending to bill GBS: pos  T&S: AB pos  HIV: neg  Rubella: immune  Hep B surface antigen: nonreactive  Sono:  10.1w: CRL 10w1d  20.3w: aga 52%, fundal placenta, AFV normal, anatomy WNL, cvx normal GBS: pos  T&S: AB pos  HIV: neg  Rubella: immune  RPR: nonreactive  Hep B surface antigen: nonreactive  Sono:  10.1w: CRL 10w1d  20.3w: aga 52%, fundal placenta, AFV normal, anatomy WNL, cvx normal

## 2024-06-05 ENCOUNTER — APPOINTMENT (OUTPATIENT)
Dept: OBGYN | Facility: CLINIC | Age: 29
End: 2024-06-05
Payer: MEDICAID

## 2024-06-05 VITALS
SYSTOLIC BLOOD PRESSURE: 123 MMHG | BODY MASS INDEX: 27.14 KG/M2 | DIASTOLIC BLOOD PRESSURE: 84 MMHG | HEIGHT: 64 IN | WEIGHT: 159 LBS

## 2024-06-05 DIAGNOSIS — Z30.40 ENCOUNTER FOR SURVEILLANCE OF CONTRACEPTIVES, UNSPECIFIED: ICD-10-CM

## 2024-06-05 LAB
BILIRUB UR QL STRIP: NORMAL
GLUCOSE UR-MCNC: NORMAL
HCG UR QL: 0.2 EU/DL
HCG UR QL: NEGATIVE
HGB UR QL STRIP.AUTO: NORMAL
KETONES UR-MCNC: NORMAL
LEUKOCYTE ESTERASE UR QL STRIP: NORMAL
NITRITE UR QL STRIP: NORMAL
PH UR STRIP: 5.5
PROT UR STRIP-MCNC: NORMAL
SP GR UR STRIP: NORMAL

## 2024-06-05 PROCEDURE — 81003 URINALYSIS AUTO W/O SCOPE: CPT | Mod: QW

## 2024-06-05 PROCEDURE — 99213 OFFICE O/P EST LOW 20 MIN: CPT

## 2024-06-05 PROCEDURE — 81025 URINE PREGNANCY TEST: CPT

## 2024-06-05 PROCEDURE — 76830 TRANSVAGINAL US NON-OB: CPT

## 2024-06-05 NOTE — PHYSICAL EXAM
[Chaperone Present] : A chaperone was present in the examining room during all aspects of the physical examination [10131] : A chaperone was present during the pelvic exam. [FreeTextEntry2] : Mandi [Labia Majora] : normal [Labia Minora] : normal [Normal] : normal [Uterine Adnexae] : normal

## 2024-06-05 NOTE — PLAN
[FreeTextEntry1] : 27 y/o p4 with dub stable will send tft and cbc will reevaluate precautions folate time 20 min

## 2024-06-05 NOTE — HISTORY OF PRESENT ILLNESS
[FreeTextEntry1] : 29 y/o 3104 s/p nsd 5 months ago, had Paragard IUD placed at the post partum visit, now for past 3 months, getting cycles q 2 months.  no pain or discharge.  ob hx: nsd x 4, one at 36.1 wks, largest 8-2 lbs  no med or surgical hx  non smoker

## 2024-06-05 NOTE — PROCEDURE
[Locate IUD] : locate IUD [Abnormal Uterine Bleeding] : abnormal uterine bleeding [Transvaginal Ultrasound] : transvaginal ultrasound [FreeTextEntry3] : normal size uterus, Paragard IUD in ee, no ff, no masses

## 2024-06-07 ENCOUNTER — APPOINTMENT (OUTPATIENT)
Dept: OBGYN | Facility: CLINIC | Age: 29
End: 2024-06-07
Payer: MEDICAID

## 2024-06-07 ENCOUNTER — NON-APPOINTMENT (OUTPATIENT)
Age: 29
End: 2024-06-07

## 2024-06-07 DIAGNOSIS — N93.8 OTHER SPECIFIED ABNORMAL UTERINE AND VAGINAL BLEEDING: ICD-10-CM

## 2024-06-07 LAB
HCT VFR BLD CALC: 41.6 %
HGB BLD-MCNC: 12.7 G/DL
MCHC RBC-ENTMCNC: 26.3 PG
MCHC RBC-ENTMCNC: 30.5 GM/DL
MCV RBC AUTO: 86.1 FL
PLATELET # BLD AUTO: 201 K/UL
RBC # BLD: 4.83 M/UL
RBC # FLD: 13.1 %
T4 FREE SERPL-MCNC: 1.4 NG/DL
TSH SERPL-ACNC: 1.3 UIU/ML
WBC # FLD AUTO: 7.32 K/UL

## 2024-06-07 PROCEDURE — 99441: CPT

## 2024-06-16 PROBLEM — N93.8 DUB (DYSFUNCTIONAL UTERINE BLEEDING): Status: ACTIVE | Noted: 2024-06-05

## 2024-08-21 NOTE — PROCEDURAL SAFETY CHECKLIST WITH OR WITHOUT SEDATION - NSBLDPRODCTAVAIL_GEN_ALL_CORE
Consent: The patient's consent was obtained including but not limited to risks of crusting, scabbing, blistering, scarring, darker or lighter pigmentary change, recurrence, incomplete removal and infection. Spray Paint Text: The liquid nitrogen was applied to the skin utilizing a spray paint frosting technique. Medical Necessity Clause: This procedure was medically necessary because the lesions that were treated were: Spray Paint Technique: No Medical Necessity Information: It is in your best interest to select a reason for this procedure from the list below. All of these items fulfill various CMS LCD requirements except the new and changing color options. Number Of Freeze-Thaw Cycles: 3 freeze-thaw cycles Show Applicator Variable?: Yes Detail Level: Detailed Post-Care Instructions: I reviewed with the patient in detail post-care instructions. Patient is to wear sunprotection, and avoid picking at any of the treated lesions. Pt may apply Vaseline to crusted or scabbing areas. not applicable

## 2024-10-10 ENCOUNTER — RX RENEWAL (OUTPATIENT)
Age: 29
End: 2024-10-10

## 2025-01-16 NOTE — PROCEDURAL SAFETY CHECKLIST WITH OR WITHOUT SEDATION - NSPRESEDATION2FT_GEN_ALL_CORE
Physical Therapy: Daily Note   Patient: Loreta Layton (49 y.o. female)   Examination Date: 2025  Plan of Care/Certification Expiration Date: 25    No data recorded   :  1975 # of Visits since SOC:   10   MRN: [id number]  CSN: [id number] Start of Care Date:   2024   Insurance: Payor: [employer] / Plan: [employer] [address] / Product Type: *No Product type* /   Insurance ID: [id number] - (Commercial) Secondary Insurance (if applicable):    Referring Physician: Fermin Angulo MD     PCP: Igor Muñoz DO Visits to Date/Visits Approved: 7 / 10    No Show/Cancelled Appts: 0 / 3     Medical Diagnosis: Cervical paraspinal muscle spasm [M62.838]    Treatment Diagnosis: cervical parspinal muscle spasms with pain into cervical and thoracic        SUBJECTIVE EXAMINATION   Pain Level:      Patient Comments: Subjective: Pt called to cancel appointment with no reason given.      Therapy Time  Individual Time In:         Individual Time Out:    Minutes:  0        Electronically signed by Berna Cyr PT  on 2025 at 1:40 PM   POC NOTE    
Anesthesia confirms case reviewed for anesthesia risk alert.

## 2025-05-11 NOTE — OB RN DELIVERY SUMMARY - NS_RESUSCITEFFORT_OBGYN_ALL_OB
Pt c/o vision is blurry, BP HTN at 177/118, all other vitals stable. . Pt denies chest pain, SOB, headache, or numbness/tingling. Pt states he does take BP medications, but has not taken them today. Home medications reviewed with pt. Resident notified via perfet serve.    Bulb Joseph-Pharynx Suction Only